# Patient Record
Sex: MALE | Race: WHITE
[De-identification: names, ages, dates, MRNs, and addresses within clinical notes are randomized per-mention and may not be internally consistent; named-entity substitution may affect disease eponyms.]

---

## 2017-04-16 ENCOUNTER — HOSPITAL ENCOUNTER (EMERGENCY)
Dept: HOSPITAL 60 - LB.ED | Age: 77
Discharge: HOME | End: 2017-04-16
Payer: MEDICARE

## 2017-04-16 VITALS — DIASTOLIC BLOOD PRESSURE: 91 MMHG | SYSTOLIC BLOOD PRESSURE: 144 MMHG

## 2017-04-16 DIAGNOSIS — Z95.1: ICD-10-CM

## 2017-04-16 DIAGNOSIS — I48.91: ICD-10-CM

## 2017-04-16 DIAGNOSIS — R10.30: Primary | ICD-10-CM

## 2017-04-16 DIAGNOSIS — F17.210: ICD-10-CM

## 2017-04-16 DIAGNOSIS — I10: ICD-10-CM

## 2017-04-16 DIAGNOSIS — Z79.899: ICD-10-CM

## 2017-04-16 DIAGNOSIS — E78.00: ICD-10-CM

## 2017-04-16 DIAGNOSIS — K21.9: ICD-10-CM

## 2017-04-16 DIAGNOSIS — Z79.82: ICD-10-CM

## 2017-04-17 NOTE — EDM.PDOC
ED HPI GENERAL MEDICAL PROBLEM





- General


Chief Complaint: General


Stated Complaint: WEAKNESS


Time Seen by Provider: 04/16/17 20:50


Source of Information: Reports: Patient


History Limitations: Reports: No limitations





- History of Present Illness


INITIAL COMMENTS - FREE TEXT/NARRATIVE: 





This is a 78yo M with severe abdominal pain prior to arrival in ER and resolved 

soon after arrival to ER. Patient denies prior episodes of abdominal pain. He 

denies any fever or chills and last meal was about 7pm. Patient denies other 

symptoms or concerns. No recent constipation or bM concerns. 


Onset: sudden


Duration: Minutes:


Location: Reports: abdomen


Quality: Reports: Ache


Severity: severe


Improves with: Reports: None


Worsens with: Reports: None


Associated Symptoms: Reports: denies other symptoms


Treatments PTA: Reports: Other (see below)


Other Treatments PTA: maxx shankar


  ** Lower Abdominal


Pain Score (Numeric/FACES): 10





- Related Data


 Allergies











Allergy/AdvReac Type Severity Reaction Status Date / Time


 


No Known Allergies Allergy   Verified 06/01/16 17:37











Home Meds: 


 Home Meds





Acetaminophen/Diphenhydramine [Tylenol Pm Ex-Strength Caplet] 1 tab PO DAILY 

PRN 05/22/16 [History]


Aspirin 81 mg PO DAILY 05/22/16 [History]


Diltiazem [Cardizem] 240 mg PO DAILY 05/22/16 [History]


Timolol [Betimol 0.5% Ophth Soln] 1 drop EYELF DAILY 05/22/16 [History]


atorvaSTATin Calcium [Atorvastatin Calcium] 40 mg PO Q48H 05/22/16 [History]











Past Medical History


HEENT History: Reports: Glaucoma, Impaired vision


Cardiovascular History: Reports: Afib, Bypass, High cholesterol, Hypertension


Respiratory History: Reports: None


Gastrointestinal History: Reports: None, GERD


Musculoskeletal History: Reports: Back pain, chronic


Neurological History: Reports: Vertigo


Other Neuro History: Occ. Headaches


Oncologic (Cancer) History: Reports: None





- Infectious Disease History


Infectious Disease History: Reports: Chicken pox, Measles





- Past Surgical History


Cardiovascular Surgical History: Reports: Coronary artery bypass


Neurological Surgical History: Reports: Lumbar spine, Thoracic spine


Other Neurological Surgeries/Procedures: B feet:  decrease in sensation, no 

pain in LEs


Musculoskeletal Surgical History: Reports: Other (see below)


Other Musculoskeletal Surgeries/Procedures:: Back Surgery





Social & Family History





- Tobacco Use


Smoking Status *Q: Current Every Day Smoker


Years of Tobacco use: 60


Packs/Tins Daily: 1


Used Tobacco, but Quit: No


Second Hand Smoke Exposure: No





- Alcohol Use


Days Per Week of Alcohol Use: 0





- Recreational Drug Use


Recreational Drug Use: No





ED ROS GENERAL





- Review of Systems


Review Of Systems: ROS reveals no pertinent complaints other than HPI.





ED EXAM, GENERAL





- Physical Exam


Exam: See Below


Exam Limited By: No limitations


General Appearance: alert, WD/WN, no apparent distress


Eye Exam: bilateral eye: PERRL


Ears: normal external exam


Nose: normal inspection


Throat/Mouth: Normal inspection


Head: atraumatic, normocephalic


Neck: normal inspection, supple, non-tender


Respiratory/Chest: no respiratory distress, lungs clear, normal breath sounds


Cardiovascular: normal peripheral pulses, regular rate, rhythm, no edema


GI/Abdominal: soft, non tender, no organomegaly, no distention, no abnormal 

bruit, no mass, abnormal bowel sounds: (hyperactive bowel sounds)


Back Exam: normal inspection


Extremities: normal inspection


Neurological: alert, oriented


Psychiatric: normal affect, normal mood





Course





- Vital Signs


Last Recorded V/S: 





 Last Vital Signs











Temp  36.4 C   04/16/17 20:54


 


Pulse  59 L  04/16/17 20:54


 


Resp  16   04/16/17 20:54


 


BP  144/91 H  04/16/17 20:54


 


Pulse Ox  99   04/16/17 20:54














Departure





- Departure


Time of Disposition: 21:00


Disposition: Home, Self-Care 01


Condition: good


Clinical Impression: 


Abdominal pain


Qualifiers:


 Abdominal location: lower abdomen, unspecified Qualified Code(s): R10.30 - 

Lower abdominal pain, unspecified





Forms:  ED Department Discharge





- Problem List & Annotations


(1) Abdominal pain


SNOMED Code(s): 59524259


   Code(s): R10.9 - UNSPECIFIED ABDOMINAL PAIN   Status: Acute   Priority: High

   


Qualifiers: 


   Abdominal location: lower abdomen, unspecified   Qualified Code(s): R10.30 - 

Lower abdominal pain, unspecified   





- Problem List Review


Problem List Initiated/Reviewed/Updated: Yes





- Assessment/Plan


Plan: 





Counseled on rtc or ER as needed if symptoms return. Discussed likely recent 

ingestion and change of meals and increased gas. Discussed supportive measures 

but for close f/u for imaging if symptoms return. Family agree for f/u as 

directed.

## 2017-12-30 ENCOUNTER — HOSPITAL ENCOUNTER (EMERGENCY)
Dept: HOSPITAL 60 - LB.ED | Age: 77
Discharge: HOME | End: 2017-12-30
Payer: MEDICARE

## 2017-12-30 VITALS — DIASTOLIC BLOOD PRESSURE: 76 MMHG | SYSTOLIC BLOOD PRESSURE: 156 MMHG

## 2017-12-30 DIAGNOSIS — Z79.82: ICD-10-CM

## 2017-12-30 DIAGNOSIS — I10: ICD-10-CM

## 2017-12-30 DIAGNOSIS — Z79.899: ICD-10-CM

## 2017-12-30 DIAGNOSIS — M10.9: Primary | ICD-10-CM

## 2017-12-30 DIAGNOSIS — F17.210: ICD-10-CM

## 2017-12-30 DIAGNOSIS — E78.00: ICD-10-CM

## 2017-12-30 NOTE — EDM.PDOC
ED HPI GENERAL MEDICAL PROBLEM





- General


Stated Complaint: SWOLLEN FOOT


Time Seen by Provider: 12/30/17 17:40


Source of Information: Reports: Patient, Family


History Limitations: Reports: No Limitations





- History of Present Illness


INITIAL COMMENTS - FREE TEXT/NARRATIVE: 





Patient is a 77 year old man with a history of gout who is having a right great 

toe attack of gout that has been worsening for one week. No injury, no fever or 

chills.  He is having a hard time walking on the foot due to the pain.


Onset: Gradual


Onset Date: 12/23/17


Onset Time: 08:00


Duration: Day(s): (7)


Location: Reports: Lower Extremity, Right


Quality: Reports: Ache, Same as Previous Episode, Sharp, Throbbing


Severity: Moderate


Improves with: Reports: Medication


Worsens with: Reports: None


Context: Reports: Other (History of gout.)


Associated Symptoms: Reports: No Other Symptoms





- Related Data


 Allergies











Allergy/AdvReac Type Severity Reaction Status Date / Time


 


No Known Allergies Allergy   Verified 12/30/17 17:57











Home Meds: 


 Home Meds





Acetaminophen/Diphenhydramine [Tylenol Pm Ex-Strength Caplet] 1 tab PO DAILY 

PRN 05/22/16 [History]


Aspirin 81 mg PO DAILY 05/22/16 [History]


Diltiazem IR [Cardizem] 180 mg PO DAILY 05/22/16 [History]


Timolol [Betimol 0.5% Ophth Soln] 1 drop EYELF DAILY 05/22/16 [History]


atorvaSTATin Calcium [Atorvastatin Calcium] 40 mg PO Q48H 05/22/16 [History]


Furosemide [Furosemide] 40 mg PO DAILY 12/30/17 [History]


Isosorbide Dinitrate 10 mg PO TID 12/30/17 [History]


Metoprolol Tartrate [Metoprolol Tartrate] 25 mg PO BID 12/30/17 [History]


Multivit, Ca, Min/FA/Soy Isofl [One-A-Day Menopause Formula Tb] 1 tab PO DAILY 

12/30/17 [History]


Ubidecarenone [Co Q-10] 200 mg PO DAILY 12/30/17 [History]











Past Medical History


HEENT History: Reports: Glaucoma, Impaired Vision


Cardiovascular History: Reports: Afib, Bypass, High Cholesterol, Hypertension


Respiratory History: Reports: None


Gastrointestinal History: Reports: None, GERD


Musculoskeletal History: Reports: Back Pain, Chronic


Neurological History: Reports: Vertigo


Other Neuro History: Occ. Headaches


Oncologic (Cancer) History: Reports: None





- Infectious Disease History


Infectious Disease History: Reports: Chicken Pox, Measles





- Past Surgical History


Cardiovascular Surgical History: Reports: Coronary Artery Bypass


Neurological Surgical History: Reports: Lumbar Spine, Thoracic Spine


Musculoskeletal Surgical History: Reports: Other (See Below)


Other Musculoskeletal Surgeries/Procedures:: laminectomy





Social & Family History





- Tobacco Use


Smoking Status *Q: Current Every Day Smoker


Years of Tobacco use: 60


Packs/Tins Daily: 1


Used Tobacco, but Quit: No


Second Hand Smoke Exposure: No





- Alcohol Use


Days Per Week of Alcohol Use: 0





- Recreational Drug Use


Recreational Drug Use: No





Review of Systems





- Review of Systems


Review Of Systems: See Below


Constitutional: Reports: No Symptoms


Eyes: Reports: No Symptoms


Ears: Reports: No Symptoms


Nose: Reports: No Symptoms


Mouth/Throat: Reports: No Symptoms


Respiratory: Reports: No Symptoms


Cardiovascular: Reports: No Symptoms


GI/Abdominal: Reports: No Symptoms


Genitourinary: Reports: No Symptoms


Musculoskeletal: Reports: Foot Pain (Right great toe MTP joint.)


Skin: Reports: Erythema (At right great toe joint.)


Neurological: Reports: No Symptoms


Psychiatric: Reports: No Symptoms





ED EXAM, GENERAL





- Physical Exam


Exam: See Below


Exam Limited By: No Limitations


General Appearance: Alert, WD/WN, No Apparent Distress


Eye Exam: Bilateral Eye: EOMI, Normal Fundi, Normal Inspection, PERRL


Ears: Normal External Exam, Normal Canal, Hearing Grossly Normal, Normal TMs


Ear Exam: Bilateral Ear: Auricle Normal, Canal Normal, TM normal


Nose: Normal Inspection


Throat/Mouth: Normal Inspection, Normal Lips, Normal Teeth, Normal Gums, Normal 

Oropharynx, Normal Voice, No Airway Compromise


Head: Atraumatic, Normocephalic


Neck: Normal Inspection, Supple, Non-Tender, Full Range of Motion


Respiratory/Chest: No Respiratory Distress, Lungs Clear, Normal Breath Sounds, 

No Accessory Muscle Use, Chest Non-Tender


Cardiovascular: Normal Peripheral Pulses, Regular Rate, Rhythm, No Edema, No 

Gallop, No JVD, No Murmur, No Rub


GI/Abdominal: Normal Bowel Sounds, Soft, Non-Tender, No Organomegaly, No 

Distention, No Abnormal Bruit, No Mass


Extremities: Redness (Right great toe MTP joint with pain on palpation and ROM.)


Neurological: Alert, Oriented, CN II-XII Intact, Normal Cognition, Normal Gait, 

Normal Reflexes, No Motor/Sensory Deficits


Psychiatric: Normal Affect, Normal Mood


Skin Exam: Warm, Dry, Intact, Normal Color, No Rash


Lymphatic: No Adenopathy





Course





- Vital Signs


Text/Narrative:: 





Uneventful ED course.  He had labs that showed gout was present.  He will be 

sent home on a Prednisone taper and Percocet 5-325 mg, one po q 4 hours prn, #

10 and he will follow up with Dr. Bender on next Tuesday or Wednesday.  Recheck 

here in ED if needed before then.





- Orders/Labs/Meds


Labs: 





 Laboratory Tests











  12/30/17 12/30/17 Range/Units





  17:55 17:55 


 


WBC  8.0   (4.0-11.0)  K/uL


 


RBC  3.56 L   (4.50-6.50)  M/uL


 


Hgb  11.2 L   (13.0-18.0)  g/dL


 


Hct  33.6 L   (40.0-54.0)  %


 


MCV  94   (76-96)  fL


 


MCH  31.5   (27.0-32.0)  pg


 


MCHC  33.3   (31.0-35.0)  g/dL


 


RDW  14.1   (11.0-16.0)  %


 


Plt Count  231   (150-400)  K/uL


 


MPV  11.2 H   (6.0-10.0)  fL


 


Neut % (Auto)  63.5   (45.0-70.0)  %


 


Lymph % (Auto)  22.4   (20.0-40.0)  %


 


Mono % (Auto)  10.4 H   (3.0-10.0)  %


 


Eos % (Auto)  3.3   (1.0-5.0)  %


 


Baso % (Auto)  0.4   (0.0-0.5)  %


 


Neut # (Auto)  5.08   (2.00-7.50)  K/uL


 


Lymph # (Auto)  1.79   (1.50-4.00)  K/uL


 


Mono # (Auto)  0.83 H   (0.20-0.80)  K/uL


 


Eos # (Auto)  0.26   (0.04-0.40)  K/uL


 


Baso # (Auto)  0.03   (0.02-0.10)  K/uL


 


Sodium   145  (136-145)  mmol/L


 


Potassium   3.3 L  (3.5-5.1)  mmol/L


 


Chloride   107  ()  mmol/L


 


Carbon Dioxide   31.8  (21.0-32.0)  mmol/L


 


Anion Gap   9.5  (5.0-15.0)  mmol/L


 


BUN   22  (8-26)  mg/dL


 


Creatinine   1.33 H D  (0.70-1.30)  mg/dL


 


Est Cr Clr Drug Dosing   TNP  


 


Estimated GFR (MDRD)   52 L  (>60)  MLS/MIN


 


BUN/Creatinine Ratio   16.5  (6-25)  


 


Glucose   130 H D  ()  mg/dL


 


Uric Acid   8.0 H  (2.6-7.2)  mg/dL


 


Calcium   8.4 L  (8.5-10.1)  mg/dL


 


Total Bilirubin   0.5  (0.0-1.0)  mg/dL


 


AST   20  (15-37)  U/L


 


ALT   19  (12-78)  U/L


 


Alkaline Phosphatase   38 L  ()  U/L


 


Total Protein   6.7  (6.4-8.2)  g/dL


 


Albumin   2.9 L  (3.4-5.0)  g/dL


 


Globulin   3.8  (2.2-4.2)  g/dL


 


Albumin/Globulin Ratio   0.8  (0.8-2.0)  














Departure





- Departure


Time of Disposition: 18:44


Disposition: Home, Self-Care 01


Condition: Good


Clinical Impression: 


 Gout attack








- Discharge Information


Referrals: 


PCP,None [Primary Care Provider] -

## 2018-05-28 ENCOUNTER — HOSPITAL ENCOUNTER (EMERGENCY)
Dept: HOSPITAL 60 - LB.ED | Age: 78
Discharge: HOME | End: 2018-05-28
Payer: MEDICARE

## 2018-05-28 DIAGNOSIS — M10.9: Primary | ICD-10-CM

## 2018-05-28 PROCEDURE — 99283 EMERGENCY DEPT VISIT LOW MDM: CPT

## 2018-05-29 VITALS — SYSTOLIC BLOOD PRESSURE: 145 MMHG | DIASTOLIC BLOOD PRESSURE: 95 MMHG

## 2018-05-29 NOTE — ER
The patient is an 78-year-old male, who comes in today with a chief complaint of pain and

swelling in the right foot.  The patient has a history of gout in the past.  He feels like

he is having another episode of gout.

 

ALLERGIES:  NKDA.

 

CURRENT MEDICATIONS:  Atorvastatin 40 mg p.o. daily, Ubidecarenone, CoQ10 of 200 mg p.o.

daily, timolol eye drops, metoprolol tartrate 25 mg p.o. b.i.d., isosorbide 10 mg p.o.

t.i.d., furosemide 40 mg p.o. daily, Cardizem 180 mg p.o. daily, aspirin 81 mg p.o. daily,

and Tylenol P.M.

 

PAST MEDICAL HISTORY:  The patient has a past history of atrial fibrillation.  He has had

gout in the past.  He has had CHF and has a history of coronary artery disease.  His wife

notes he had another stent placed.  He has had two in the last year, but otherwise had

cardiac bypass surgery about 10 years ago.

 

PHYSICAL EXAMINATION:

GENERAL:  He is alert, oriented, no apparent distress.  His right foot is swollen by the

metatarsal joint on the great toe.  It is warm, hot, red to the touch.  Appears quite

classic for gout.

 

ASSESSMENT:  Gout.

 

PLAN:  We will put him on some prednisone 40 mg p.o. daily x7 days, then decreasing by 10 mg

a day for 3 more days.  Patient to return to clinic if this fails to improve dramatically

over the next 24 to 48 hours.

 

 

NGOC

DD:  05/28/2018 20:55:40

DT:  05/29/2018 01:02:52

Job #:  [id number]

## 2018-08-28 ENCOUNTER — HOSPITAL ENCOUNTER (EMERGENCY)
Dept: HOSPITAL 60 - LB.ED | Age: 78
Discharge: HOME | End: 2018-08-28
Payer: MEDICARE

## 2018-08-28 VITALS — SYSTOLIC BLOOD PRESSURE: 143 MMHG | DIASTOLIC BLOOD PRESSURE: 96 MMHG

## 2018-08-28 DIAGNOSIS — I48.91: Primary | ICD-10-CM

## 2018-08-28 DIAGNOSIS — I10: ICD-10-CM

## 2018-08-28 NOTE — CR
DATE OF SERVICE:  08/28/18

CLINICAL DATA:  shortness of breath, chest pain



PORTABLE AP CHEST:  



Comparison made to a prior exam dated 08/25/18.



The heart and lungs are stable.  No acute abnormalities.  



[id number]
[facility]

## 2018-08-28 NOTE — EDM.PDOC
ED HPI GENERAL MEDICAL PROBLEM





- General


Chief Complaint: General


Stated Complaint: SOB


Time Seen by Provider: 08/28/18 02:45


Source of Information: Reports: Patient


History Limitations: Reports: No Limitations





- History of Present Illness


INITIAL COMMENTS - FREE TEXT/NARRATIVE: 





This is a 77yo M who comes in with shortness of breath and occasional chest 

pain. He states the shortness of breath started a month ago and he recently was 

in the ER for chest pains. He states he slept well yesterday but last night he 

was unable to sleep due to his shortness of breath. It is noted that he was 

recently changed from diltiazem and isosorbide to imdur and metorpolol. He was 

discharged on Saturday with metoprolol tartrate BID and Imdur 30mg under the 

direction of Dr. Chapman and Dr. Alston. He was counseled to stop his metoprolol 

tartrate and start on his recently obtained prescription of metoprolol 

succinate in the morning. He did not have any meds last evening. He states he 

feels a little better since getting up and outside prior to arriving at the ER. 


Onset: Gradual


Duration: Week(s): (4), Getting Worse


Location: Reports: Generalized


Severity: Mild


Treatments PTA: Reports: Aspirin





- Related Data


 Allergies











Allergy/AdvReac Type Severity Reaction Status Date / Time


 


No Known Allergies Allergy   Verified 05/29/18 00:27











Home Meds: 


 Home Meds





Aspirin 81 mg PO DAILY 05/22/16 [History]


Diltiazem IR [Cardizem] 180 mg PO DAILY 05/22/16 [History]


atorvaSTATin Calcium [Atorvastatin Calcium] 40 mg PO DAILY 05/22/16 [History]


Isosorbide Dinitrate 10 mg PO TID 12/30/17 [History]


Metoprolol Tartrate 50 mg PO DAILY 12/30/17 [History]


Clopidogrel Bisulfate [Clopidogrel] 1 tab PO DAILY 05/29/18 [History]











Past Medical History


HEENT History: Reports: Glaucoma, Impaired Vision


Cardiovascular History: Reports: Afib, Bypass, High Cholesterol, Hypertension


Respiratory History: Reports: None


Gastrointestinal History: Reports: GERD


Musculoskeletal History: Reports: Back Pain, Chronic


Neurological History: Reports: Vertigo


Other Neuro History: Occ. Headaches


Hematologic History: Reports: Anticoagulation Therapy


Oncologic (Cancer) History: Reports: None





- Infectious Disease History


Infectious Disease History: Reports: Chicken Pox





- Past Surgical History


HEENT Surgical History: Reports: None


Cardiovascular Surgical History: Reports: Coronary Artery Bypass


Other Cardiovascular Surgeries/Procedures: 1993


Neurological Surgical History: Reports: Lumbar Spine, Thoracic Spine


Musculoskeletal Surgical History: Reports: Other (See Below)


Other Musculoskeletal Surgeries/Procedures:: laminectomy





Social & Family History





- Family History


Family Medical History: Noncontributory





- Caffeine Use


Caffeine Use: Reports: Coffee





ED ROS GENERAL





- Review of Systems


Review Of Systems: ROS reveals no pertinent complaints other than HPI.





ED EXAM, GENERAL





- Physical Exam


Exam: See Below


Exam Limited By: No Limitations


General Appearance: Alert, WD/WN, No Apparent Distress


Eye Exam: Bilateral Eye: EOMI, PERRL


Ears: Normal External Exam


Nose: Normal Inspection


Throat/Mouth: Normal Inspection


Head: Atraumatic, Normocephalic


Neck: Normal Inspection


Respiratory/Chest: No Respiratory Distress, Lungs Clear, Normal Breath Sounds


Cardiovascular: Irregularly Irregular


Peripheral Pulses: 2+: Dorsalis Pedis (L), Dorsalis Pedis (R)


GI/Abdominal: Normal Bowel Sounds


Back Exam: Normal Inspection


Extremities: Normal Inspection





Course





- Vital Signs


Last Recorded V/S: 


 Last Vital Signs











Temp      


 


Pulse  110 H  08/28/18 04:25


 


Resp      


 


BP  143/96 H  08/28/18 04:25


 


Pulse Ox      














- Orders/Labs/Meds


Orders: 


 Active Orders 24 hr











 Category Date Time Status


 


 Cardiac Monitoring [RC] .As Directed Care  08/28/18 02:35 Active


 


 EKG Documentation Completion [RC] ASDIRECTED Care  08/28/18 02:58 Active


 


 HEPATITIS PANEL (4) Routine Lab  08/28/18 03:20 Received


 


 REVERSE T3, SERUM Routine Lab  08/28/18 03:20 Received


 


 THYROXINE (T4) FREE, DIRECT, S Routine Lab  08/28/18 03:20 Received











Labs: 


 Laboratory Tests











  08/28/18 08/28/18 08/28/18 Range/Units





  03:20 03:20 03:20 


 


WBC  8.6    (4.0-11.0)  K/uL


 


RBC  3.98 L    (4.50-6.50)  M/uL


 


Hgb  12.6 L    (13.0-18.0)  g/dL


 


Hct  37.5 L    (40.0-54.0)  %


 


MCV  94    (76-96)  fL


 


MCH  31.7    (27.0-32.0)  pg


 


MCHC  33.6    (31.0-35.0)  g/dL


 


RDW  15.4    (11.0-16.0)  %


 


Plt Count  166    (150-400)  K/uL


 


MPV  12.5 H    (6.0-10.0)  fL


 


Neut % (Auto)  63.3    (45.0-70.0)  %


 


Lymph % (Auto)  23.6    (20.0-40.0)  %


 


Mono % (Auto)  11.0 H    (3.0-10.0)  %


 


Eos % (Auto)  1.5    (1.0-5.0)  %


 


Baso % (Auto)  0.6 H    (0.0-0.5)  %


 


Neut # (Auto)  5.41    (2.00-7.50)  K/uL


 


Lymph # (Auto)  2.02    (1.50-4.00)  K/uL


 


Mono # (Auto)  0.94 H    (0.20-0.80)  K/uL


 


Eos # (Auto)  0.13    (0.04-0.40)  K/uL


 


Baso # (Auto)  0.05    (0.02-0.10)  K/uL


 


PT   11.9 H   (9.0-11.5)  sec


 


INR   1.2   (1.0-3.5)  


 


D-Dimer, Quantitative     (0-400)  ng/mL


 


Sodium    144  (136-145)  mmol/L


 


Potassium    4.0  (3.5-5.1)  mmol/L


 


Chloride    108 H  ()  mmol/L


 


Carbon Dioxide    23.8  (21.0-32.0)  mmol/L


 


Anion Gap    16.2 H  (5.0-15.0)  mmol/L


 


BUN    27 H  (8-26)  mg/dL


 


Creatinine    1.15  (0.70-1.30)  mg/dL


 


Est Cr Clr Drug Dosing    TNP  


 


Estimated GFR (MDRD)    > 60  (>60)  MLS/MIN


 


BUN/Creatinine Ratio    23.5  (6-25)  


 


Glucose    124 H  ()  mg/dL


 


Calcium    8.8  (8.5-10.1)  mg/dL


 


Total Bilirubin    1.1 H D  (0.0-1.0)  mg/dL


 


AST    48 H  (15-37)  U/L


 


ALT    91 H  (12-78)  U/L


 


Alkaline Phosphatase    40 L  ()  U/L


 


Troponin I    0.037  D  (0.000-0.060)  ng/mL


 


B-Natriuretic Peptide    35977 H D  (0-450)  pg/mL


 


Total Protein    6.7  (6.4-8.2)  g/dL


 


Albumin    3.5  (3.4-5.0)  g/dL


 


Globulin    3.2  (2.2-4.2)  g/dL


 


Albumin/Globulin Ratio    1.1  (0.8-2.0)  


 


TSH, Ultra Sensitive    10.312 H D  (0.358-3.740)  uIU/mL














  08/28/18 Range/Units





  03:20 


 


WBC   (4.0-11.0)  K/uL


 


RBC   (4.50-6.50)  M/uL


 


Hgb   (13.0-18.0)  g/dL


 


Hct   (40.0-54.0)  %


 


MCV   (76-96)  fL


 


MCH   (27.0-32.0)  pg


 


MCHC   (31.0-35.0)  g/dL


 


RDW   (11.0-16.0)  %


 


Plt Count   (150-400)  K/uL


 


MPV   (6.0-10.0)  fL


 


Neut % (Auto)   (45.0-70.0)  %


 


Lymph % (Auto)   (20.0-40.0)  %


 


Mono % (Auto)   (3.0-10.0)  %


 


Eos % (Auto)   (1.0-5.0)  %


 


Baso % (Auto)   (0.0-0.5)  %


 


Neut # (Auto)   (2.00-7.50)  K/uL


 


Lymph # (Auto)   (1.50-4.00)  K/uL


 


Mono # (Auto)   (0.20-0.80)  K/uL


 


Eos # (Auto)   (0.04-0.40)  K/uL


 


Baso # (Auto)   (0.02-0.10)  K/uL


 


PT   (9.0-11.5)  sec


 


INR   (1.0-3.5)  


 


D-Dimer, Quantitative  316  (0-400)  ng/mL


 


Sodium   (136-145)  mmol/L


 


Potassium   (3.5-5.1)  mmol/L


 


Chloride   ()  mmol/L


 


Carbon Dioxide   (21.0-32.0)  mmol/L


 


Anion Gap   (5.0-15.0)  mmol/L


 


BUN   (8-26)  mg/dL


 


Creatinine   (0.70-1.30)  mg/dL


 


Est Cr Clr Drug Dosing   


 


Estimated GFR (MDRD)   (>60)  MLS/MIN


 


BUN/Creatinine Ratio   (6-25)  


 


Glucose   ()  mg/dL


 


Calcium   (8.5-10.1)  mg/dL


 


Total Bilirubin   (0.0-1.0)  mg/dL


 


AST   (15-37)  U/L


 


ALT   (12-78)  U/L


 


Alkaline Phosphatase   ()  U/L


 


Troponin I   (0.000-0.060)  ng/mL


 


B-Natriuretic Peptide   (0-450)  pg/mL


 


Total Protein   (6.4-8.2)  g/dL


 


Albumin   (3.4-5.0)  g/dL


 


Globulin   (2.2-4.2)  g/dL


 


Albumin/Globulin Ratio   (0.8-2.0)  


 


TSH, Ultra Sensitive   (0.358-3.740)  uIU/mL











Meds: 


Medications














Discontinued Medications














Generic Name Dose Route Start Last Admin





  Trade Name Freq  PRN Reason Stop Dose Admin


 


Metoprolol Tartrate  5 mg  08/28/18 04:18  08/28/18 04:25





  Lopressor  IVPUSH  08/28/18 04:19  5 mg





  ONETIME ONE   Administration





     





     





     





     














Departure





- Departure


Time of Disposition: 05:00


Disposition: Home, Self-Care 01


Condition: Fair


Clinical Impression: 


 Shortness of breath, Chest tightness or pressure, Atrial fibrillation with RVR








- Discharge Information


Referrals: 


PCP,None [Primary Care Provider] - 


Forms:  ED Department Discharge





- Problem List Review


Problem List Initiated/Reviewed/Updated: Yes





- My Orders


Last 24 Hours: 


My Active Orders





08/28/18 02:35


Cardiac Monitoring [RC] .As Directed 





08/28/18 02:58


EKG Documentation Completion [RC] ASDIRECTED 





08/28/18 03:20


HEPATITIS PANEL (4) Routine 


REVERSE T3, SERUM Routine 


THYROXINE (T4) FREE, DIRECT, S Routine 














- Assessment/Plan


Last 24 Hours: 


My Active Orders





08/28/18 02:35


Cardiac Monitoring [RC] .As Directed 





08/28/18 02:58


EKG Documentation Completion [RC] ASDIRECTED 





08/28/18 03:20


HEPATITIS PANEL (4) Routine 


REVERSE T3, SERUM Routine 


THYROXINE (T4) FREE, DIRECT, S Routine 











Plan: 





Counseled on hospital admit but patient would like to go home. Discussed 

options and plan of care and patient will f/u closely in clinic and return to 

ER as needed. Discussed rate control and increase to metoprolol 75 mg ER. F/u 

in clinic for recheck and rate control and BP control closely and as needed in 

ER.

## 2018-08-29 ENCOUNTER — HOSPITAL ENCOUNTER (EMERGENCY)
Dept: HOSPITAL 60 - LB.ED | Age: 78
Discharge: NURSING, REHAB OR LONG-TERM CARE | End: 2018-08-29
Payer: MEDICARE

## 2018-08-29 DIAGNOSIS — Z53.21: Primary | ICD-10-CM

## 2018-08-29 PROCEDURE — A0425 GROUND MILEAGE: HCPCS

## 2018-08-29 PROCEDURE — A0429 BLS-EMERGENCY: HCPCS

## 2018-08-30 LAB — HEP C VIRUS AB: <0.1 S/CO RATIO (ref 0–0.9)

## 2018-09-10 ENCOUNTER — RECORDS - HEALTHEAST (OUTPATIENT)
Dept: ADMINISTRATIVE | Facility: OTHER | Age: 78
End: 2018-09-10

## 2018-09-13 ENCOUNTER — RECORDS - HEALTHEAST (OUTPATIENT)
Dept: ADMINISTRATIVE | Facility: OTHER | Age: 78
End: 2018-09-13

## 2019-02-11 ENCOUNTER — HOSPITAL ENCOUNTER (EMERGENCY)
Dept: HOSPITAL 60 - LB.ED | Age: 79
Discharge: HOME | End: 2019-02-11
Payer: COMMERCIAL

## 2019-02-11 VITALS — SYSTOLIC BLOOD PRESSURE: 116 MMHG | DIASTOLIC BLOOD PRESSURE: 80 MMHG

## 2019-02-11 DIAGNOSIS — I48.91: ICD-10-CM

## 2019-02-11 DIAGNOSIS — I10: ICD-10-CM

## 2019-02-11 DIAGNOSIS — I11.0: Primary | ICD-10-CM

## 2019-02-11 DIAGNOSIS — I50.9: ICD-10-CM

## 2019-02-11 DIAGNOSIS — I25.2: ICD-10-CM

## 2019-02-11 DIAGNOSIS — Z79.899: ICD-10-CM

## 2019-02-11 DIAGNOSIS — E78.00: ICD-10-CM

## 2019-02-11 DIAGNOSIS — J44.9: ICD-10-CM

## 2019-02-11 DIAGNOSIS — K21.9: ICD-10-CM

## 2019-02-11 DIAGNOSIS — Z79.01: ICD-10-CM

## 2019-02-11 NOTE — EDM.PDOC
ED HPI GENERAL MEDICAL PROBLEM





- General


Chief Complaint: Cardiovascular Problem


Stated Complaint: SOB/weight gain


Time Seen by Provider: 02/11/19 14:20


Source of Information: Reports: Patient, Family


History Limitations: Reports: No Limitations





- History of Present Illness


INITIAL COMMENTS - FREE TEXT/NARRATIVE: 


Pt is 78 year old male with severe ischemic cardiomyopathy with CHF.According  

to patient's spouse , patient has gained about 11 lbs of weight over the past 1 

wk. He has also been having increased swelling of the lower extremities and 

noted to be short of breath with exertion. She did call his Cardiologist's 

office at [facility] and was told to go to emergency room.Pt has been on 

lasix 30mg BID. He did not get his lasix today as his systolic pressure was 

90mmhg in the morning.





Pt on questioning claims he feels fine. No chest pain or discomfort. His SPO2 

is 93% on room air. Presently not short of breath. No other complaints.





Onset: Gradual


Duration: Week(s): (1), Getting Worse


Severity: Mild


Improves with: Reports: None


Worsens with: Reports: None


Associated Symptoms: Reports: Shortness of Breath, Weakness.  Denies: Confusion

, Chest Pain, Cough, Diaphoresis, Fever/Chills, Headaches, Nausea/Vomiting, Rash

, Seizure, Syncope





- Related Data


 Allergies











Allergy/AdvReac Type Severity Reaction Status Date / Time


 


No Known Allergies Allergy   Verified 02/11/19 14:29











Home Meds: 


 Home Meds





Apixaban [Eliquis] 5 mg PO BID 09/09/18 [History]


Timolol [Betimol 0.5% Ophth Soln] 1 drop EYELF QPM 09/09/18 [History]


atorvaSTATin [Lipitor] 40 mg PO BEDTIME 11/02/18 [History]


Acetaminophen [Tylenol Arthritis Pain] 650 mg PO Q4H PRN  tab.er 11/12/18 [Rx]


Amiodarone [Cordarone] 200 mg PO DAILY #0 tablet 11/12/18 [Rx]


Clopidogrel [Plavix] 75 mg PO DAILY  tablet 11/12/18 [Rx]


Levothyroxine Sodium [Synthroid] 100 mcg PO ACBREAKFAST 30 Days #30 tab 11/12/ 18 [Rx]


Nitroglycerin [Nitrostat] 0.4 mg SL Q5M PRN  tab.sl 11/12/18 [Rx]


Tamsulosin [Flomax] 0.4 mg PO BEDTIME 30 Days #30 cap.er 11/12/18 [Rx]


Famotidine [Pepcid] 20 mg PO ACBREAKFAST 02/11/19 [History]


Furosemide [Lasix] 30 mg PO ASDIRECTED PRN 02/11/19 [History]


Metoprolol Tartrate 50 mg PO BID 02/11/19 [History]


Potassium Chloride [Klor-Con] 20 meq PO DAILY 02/11/19 [History]











Past Medical History


HEENT History: Reports: Glaucoma, Impaired Vision


Cardiovascular History: Reports: Afib, Bypass, High Cholesterol, Hypertension, 

MI, Pacemaker


Other Cardiovascular History: cardiac arrest 10/18 pacemaker, 2 recent stents, 

automatic defibrillater


Respiratory History: Reports: COPD


Gastrointestinal History: Reports: GERD


Musculoskeletal History: Reports: Back Pain, Chronic


Neurological History: Reports: Vertigo


Other Neuro History: Occ. Headaches


Endocrine/Metabolic History: Reports: Hypoparathyroidism


Hematologic History: Reports: Anticoagulation Therapy


Oncologic (Cancer) History: Reports: None





- Infectious Disease History


Infectious Disease History: Reports: Chicken Pox





- Past Surgical History


HEENT Surgical History: Reports: None


Cardiovascular Surgical History: Reports: Coronary Artery Bypass, Coronary 

Artery Stent, Pacer


Other Cardiovascular Surgeries/Procedures: 1993


GI Surgical History: Reports: None


Neurological Surgical History: Reports: Lumbar Spine, Thoracic Spine


Musculoskeletal Surgical History: Reports: Other (See Below)


Other Musculoskeletal Surgeries/Procedures:: laminectomy





Social & Family History





- Family History


Family Medical History: Noncontributory





- Caffeine Use


Caffeine Use: Reports: Coffee





ED ROS GENERAL





- Review of Systems


Review Of Systems: See Below


Constitutional: Reports: Weakness.  Denies: Fever, Chills, Night Sweats, 

Diaphoresis


HEENT: Denies: Rhinitis, Throat Pain, Throat Swelling


Respiratory: Reports: Shortness of Breath.  Denies: Wheezing, Pleuritic Chest 

Pain, Cough, Sputum


Cardiovascular: Reports: Edema.  Denies: Chest Pain, Lightheadedness, Syncope


GI/Abdominal: Denies: Abdominal Pain, Nausea, Vomiting


Musculoskeletal: Denies: Joint Pain, Joint Swelling


Skin: Denies: Bruising, Pruritis, Rash


Neurological: Denies: Confusion, Dizziness, Headache, Numbness, Tingling





ED EXAM, GENERAL





- Physical Exam


Exam: See Below


Exam Limited By: No Limitations


General Appearance: Alert, WD/WN, No Apparent Distress


Eye Exam: Bilateral Eye: EOMI, PERRL


Ears: Normal External Exam, Normal Canal, Hearing Grossly Normal, Normal TMs


Ear Exam: Bilateral Ear: Auricle Normal, Canal Normal, TM normal


Nose: Normal Inspection, Normal Mucosa, No Blood


Throat/Mouth: Normal Inspection, Normal Lips, Normal Teeth, Normal Gums, Normal 

Oropharynx, Normal Voice, No Airway Compromise


Head: Atraumatic, Normocephalic


Neck: Normal Inspection, Supple, Non-Tender, Full Range of Motion


Respiratory/Chest: No Respiratory Distress, Lungs Clear, Normal Breath Sounds, 

No Accessory Muscle Use, Chest Non-Tender


Cardiovascular: Normal Peripheral Pulses, Regular Rate, Rhythm, No Edema, No 

Gallop, No JVD, No Murmur, No Rub


GI/Abdominal: Normal Bowel Sounds, Soft, Non-Tender, No Organomegaly, No 

Distention, No Abnormal Bruit, No Mass


Extremities: Normal Inspection, Pedal Edema (3+ pitting type B/L)


Neurological: Alert, Oriented





EKG INTERPRETATION


EKG Date: 02/11/19


EKG Interpretation Comments: 


paced rhythm rate 70s








Course





- Vital Signs


Text/Narrative:: 


Pt's clinical exam appears normal other than his lower extremity pitting edema. 

His cbc is normal. CMP shows normal renal functions and electrolytes. His BNP 

is elevated from chronic CHF. He is not n any distress. His Chest x-ray appears 

normal, other than cardiomegaly, no signs of fluid over load.





Pt and family reassured with results. I have recommended daily weights along 

with close  fluid intake and output monitoring. Strict salt restriction to 1.2 

gm daily. Fluid restriction to 1000cc/ daily.He does have lower extremity edema 

where all the fluid seems to retained. He did receive 20mg IV Lasix. Also I 

have stopped his lasix and tried oral bumex 40mg BID for next week to see, if 

he diuresis better. Also advised to continue potassium daily. 





Call in 2-3 days with weight and fluid charting. Return to emergency room if 

symptoms worsen.





Last Recorded V/S: 





 Last Vital Signs











Temp  97.7 F   02/11/19 14:17


 


Pulse  68   02/11/19 15:18


 


Resp  20   02/11/19 15:18


 


BP  116/80   02/11/19 14:30


 


Pulse Ox  99   02/11/19 15:18














- Orders/Labs/Meds


Orders: 





 Active Orders 24 hr











 Category Date Time Status


 


 Chest 1V Frontal [CR] Stat Exams  02/11/19 14:40 Taken











Labs: 





 Laboratory Tests











  02/11/19 02/11/19 02/11/19 Range/Units





  14:40 14:40 14:40 


 


WBC   5.4  D   (4.0-11.0)  K/uL


 


RBC   3.01 L   (4.50-6.50)  M/uL


 


Hgb   8.8 L   (13.0-18.0)  g/dL


 


Hct   28.1 L   (40.0-54.0)  %


 


MCV   93   (76-96)  fL


 


MCH   29.2   (27.0-32.0)  pg


 


MCHC   31.3   (31.0-35.0)  g/dL


 


RDW   15.2   (11.0-16.0)  %


 


Plt Count   205   (150-400)  K/uL


 


MPV   12.0 H   (6.0-10.0)  fL


 


Neut % (Auto)   65.4   (45.0-70.0)  %


 


Lymph % (Auto)   20.6   (20.0-40.0)  %


 


Mono % (Auto)   9.5   (3.0-10.0)  %


 


Eos % (Auto)   3.9   (1.0-5.0)  %


 


Baso % (Auto)   0.6 H   (0.0-0.5)  %


 


Neut # (Auto)   3.52   (2.00-7.50)  K/uL


 


Lymph # (Auto)   1.11 L   (1.50-4.00)  K/uL


 


Mono # (Auto)   0.51   (0.20-0.80)  K/uL


 


Eos # (Auto)   0.21   (0.04-0.40)  K/uL


 


Baso # (Auto)   0.03   (0.02-0.10)  K/uL


 


Sodium    143  (136-145)  mmol/L


 


Potassium    3.7  (3.5-5.1)  mmol/L


 


Chloride    103  ()  mmol/L


 


Carbon Dioxide    29.0  D  (21.0-32.0)  mmol/L


 


Anion Gap    14.7  (5.0-15.0)  mmol/L


 


BUN    24  (8-26)  mg/dL


 


Creatinine    1.31 H  (0.70-1.30)  mg/dL


 


Est Cr Clr Drug Dosing    TNP  


 


Estimated GFR (MDRD)    53 L  (>60)  MLS/MIN


 


BUN/Creatinine Ratio    18.3  (6-25)  


 


Glucose    113 H  ()  mg/dL


 


Calcium    8.7  (8.5-10.1)  mg/dL


 


Total Bilirubin    0.7  (0.0-1.0)  mg/dL


 


AST    22  (15-37)  U/L


 


ALT    25  (12-78)  U/L


 


Alkaline Phosphatase    50  ()  U/L


 


B-Natriuretic Peptide  29640 H D    (0-450)  pg/mL


 


Total Protein    7.1  (6.4-8.2)  g/dL


 


Albumin    3.4  (3.4-5.0)  g/dL


 


Globulin    3.7  (2.2-4.2)  g/dL


 


Albumin/Globulin Ratio    0.9  (0.8-2.0)  











Meds: 





Medications














Discontinued Medications














Generic Name Dose Route Start Last Admin





  Trade Name Freq  PRN Reason Stop Dose Admin


 


Furosemide  Confirm  02/11/19 15:51  





  Lasix  Administered  02/11/19 15:52  





  Dose   





  40 mg   





  .ROUTE   





  .STK-MED ONE   





     





     





     





     


 


Furosemide  20 mg  02/11/19 15:45  





  Lasix  IVPUSH  02/11/19 15:46  





  NOW ONE   





     





     





     





     














Departure





- Departure


Time of Disposition: 15:30


Disposition: Home, Self-Care 01


Condition: Fair


Clinical Impression: 


 CHF (congestive heart failure), NYHA class III





Instructions:  Low-Sodium Eating Plan, Heart Failure, Easy-to-Read


Referrals: 


PCP,None [Primary Care Provider] - 


Forms:  ED Department Discharge


Additional Instructions: 


Take Bumex 1mg by mouth twice a day. May start tonight.


Monitor strict intake and output of fluids. 


Continue with daily weights.


Restrict sodium to 1,200mg in a day.


Report back to Dr. Chapman in 2-3 days to see how Bumex is working for you. Call 

clinic at [phone].





Seek medical attention for severe shortness of breath, nausea/vomiting, chest 

pain, or dizziness.





- Problem List & Annotations


(1) CHF (congestive heart failure), NYHA class III


SNOMED Code(s): 768107974, 222110238


   Code(s): I50.9 - HEART FAILURE, UNSPECIFIED   Status: Acute   Current Visit: 

Yes   





- Problem List Review


Problem List Initiated/Reviewed/Updated: Yes





- My Orders


Last 24 Hours: 





My Active Orders





02/11/19 14:40


Chest 1V Frontal [CR] Stat 














- Assessment/Plan


Last 24 Hours: 





My Active Orders





02/11/19 14:40


Chest 1V Frontal [CR] Stat 











Assessment:: 


CHF with weight gain





Plan: 


Pt's clinical exam appears normal other than his lower extremity pitting edema. 

His cbc is normal. CMP shows normal renal functions and electrolytes. His BNP 

is elevated from chronic CHF. He is not n any distress. His Chest x-ray appears 

normal, other than cardiomegaly, no signs of fluid over load.





Pt and family reassured with results. I have recommended daily weights along 

with close  fluid intake and output monitoring. Strict salt restriction to 1.2 

gm daily. Fluid restriction to 1000cc/ daily.He does have lower extremity edema 

where all the fluid seems to retained. He did receive 20mg IV Lasix. Also I 

have stopped his lasix and tried oral bumex 40mg BID for next week to see, if 

he diuresis better. Also advised to continue potassium daily. 





Call in 2-3 days with weight and fluid charting. Return to emergency room if 

symptoms worsen.

## 2019-02-11 NOTE — CR
PORTABLE CHEST, 02/11/19



Comparison is made to a prior exam dated 12/16/18. 



The patient is status post median sternotomy.  The cardiac pacer and pacer 
wires remain unchanged in position.  



The heart remains enlarged, unchanged.  The aorta is calcified and ectatic.  



The pulmonary vascular congestion on the prior exam has improved.  



The lungs are clear.  No pneumothorax.  No pleural effusions.  



[id number]
MTDD

## 2021-07-24 NOTE — EDM.PDOC
ED HPI GENERAL MEDICAL PROBLEM





- General


Chief Complaint: General


Stated Complaint: weakness, yellow skin


Time Seen by Provider: 07/24/21 13:05


Source of Information: Reports: Patient


History Limitations: Reports: No Limitations





- History of Present Illness


INITIAL COMMENTS - FREE TEXT/NARRATIVE: 





patient was brought to the ER by his wife - due to a c/o jaundice and weight 

loss for 1-2 weeks.





Loss appetite as well. Lost around 20 lbs over 1 month.





no N/V/D.





but repots his stool is pale and got dark urine. 





no abdominal discomfort.





h/o CAD, and CHF - has a pacemaker. 














Onset: Gradual


Duration: Week(s): (2)


  ** Right Lower Abdomen


Pain Score (Numeric/FACES): 1





- Related Data


                                    Allergies











Allergy/AdvReac Type Severity Reaction Status Date / Time


 


No Known Allergies Allergy   Verified 02/11/19 14:29











Home Meds: 


                                    Home Meds





Apixaban [Eliquis] 5 mg PO BID 09/09/18 [History]


Timolol [Betimol 0.5% Ophth Soln] 1 drop EYELF QPM 09/09/18 [History]


atorvaSTATin [Lipitor] 40 mg PO BEDTIME 11/02/18 [History]


Acetaminophen [Tylenol Arthritis Pain] 650 mg PO Q4H PRN  tab.er 11/12/18 [Rx]


Amiodarone [Cordarone] 200 mg PO DAILY #0 tablet 11/12/18 [Rx]


Clopidogrel [Plavix] 75 mg PO DAILY  tablet 11/12/18 [Rx]


Levothyroxine Sodium [Synthroid] 100 mcg PO ACBREAKFAST 30 Days #30 tab 11/12/18

 [Rx]


Nitroglycerin [Nitrostat] 0.4 mg SL Q5M PRN  tab.sl 11/12/18 [Rx]


Tamsulosin [Flomax] 0.4 mg PO BEDTIME 30 Days #30 cap.er 11/12/18 [Rx]


Famotidine [Pepcid] 20 mg PO ACBREAKFAST 02/11/19 [History]


Furosemide [Lasix] 30 mg PO ASDIRECTED PRN 02/11/19 [History]


Metoprolol Tartrate 50 mg PO BID 02/11/19 [History]


Potassium Chloride [Klor-Con] 20 meq PO DAILY 02/11/19 [History]











Past Medical History


HEENT History: Reports: Glaucoma, Impaired Vision


Cardiovascular History: Reports: Afib, Bypass, High Cholesterol, Hypertension, 

MI, Pacemaker


Other Cardiovascular History: cardiac arrest 10/18 pacemaker, 2 recent stents, 

automatic defibrillater


Respiratory History: Reports: COPD


Gastrointestinal History: Reports: GERD


Musculoskeletal History: Reports: Back Pain, Chronic


Neurological History: Reports: Vertigo


Other Neuro History: Occ. Headaches


Endocrine/Metabolic History: Reports: Hypoparathyroidism


Hematologic History: Reports: Anticoagulation Therapy


Oncologic (Cancer) History: Reports: None





- Infectious Disease History


Infectious Disease History: Reports: Chicken Pox





- Past Surgical History


HEENT Surgical History: Reports: None


Cardiovascular Surgical History: Reports: Coronary Artery Bypass, Coronary 

Artery Stent, Pacer


Other Cardiovascular Surgeries/Procedures: 1993


GI Surgical History: Reports: None


Neurological Surgical History: Reports: Lumbar Spine, Thoracic Spine


Musculoskeletal Surgical History: Reports: Other (See Below)


Other Musculoskeletal Surgeries/Procedures:: laminectomy





Social & Family History





- Family History


Family Medical History: No Pertinent Family History





- Caffeine Use


Caffeine Use: Reports: Coffee





ED ROS GENERAL





- Review of Systems


Review Of Systems: See Below


Constitutional: Reports: Malaise, Fatigue, Weight Loss


Respiratory: Reports: No Symptoms


Cardiovascular: Reports: No Symptoms


Skin: Reports: Jaundice





ED EXAM, GENERAL





- Physical Exam


Exam: See Below


Exam Limited By: No Limitations


General Appearance: Alert, WD/WN, No Apparent Distress, Thin, Cachetic


Eye Exam: Bilateral Eye: EOMI


Head: Atraumatic


Neck: Normal Inspection


Respiratory/Chest: No Respiratory Distress, Lungs Clear, Normal Breath Sounds


Cardiovascular: Normal Peripheral Pulses, Regular Rate, Rhythm


GI/Abdominal: Normal Bowel Sounds, Soft, Non-Tender, Distended


Neurological: Alert, Oriented, No Motor/Sensory Deficits


Psychiatric: Normal Affect


Skin Exam: Jaundice





Course





- Vital Signs


Last Recorded V/S: 


                                Last Vital Signs











Temp  37.1 C   07/24/21 13:14


 


Pulse  85   07/24/21 13:14


 


Resp  18   07/24/21 13:14


 


BP  106/81   07/24/21 13:14


 


Pulse Ox  98   07/24/21 13:14














- Orders/Labs/Meds


Orders: 


                               Active Orders 24 hr











 Category Date Time Status


 


 Patient Status [ADT] Routine ADT  07/24/21 14:51 Ordered


 


 Oxygen Therapy [RC] PRN Care  07/24/21 14:51 Ordered


 


 Vital Signs [RC] Q4H Care  07/24/21 14:51 Ordered


 


 Regular Diet [DIET] Diet  07/24/21 Dinner Ordered


 


 Abdomen Pelvis wo Cont [CT] Stat Exams  07/24/21 13:46 Taken


 


 UA W/MICROSCOPIC [URIN] Stat Lab  07/24/21 12:51 Ordered


 


 Resuscitation Status Routine Resus Stat  07/24/21 14:51 Ordered











Labs: 


                                Laboratory Tests











  07/24/21 07/24/21 07/24/21 Range/Units





  13:00 13:00 13:00 


 


WBC  6.7    (4.0-11.0)  K/uL


 


RBC  3.98 L    (4.50-6.50)  M/uL


 


Hgb  12.7 L    (13.0-18.0)  g/dL


 


Hct  35.9 L    (40.0-54.0)  %


 


MCV  90    (76-96)  fL


 


MCH  31.9    (27.0-32.0)  pg


 


MCHC  35.4 H    (31.0-35.0)  g/dL


 


RDW  16.4 H    (11.0-16.0)  %


 


Plt Count  209  D    (150-400)  K/uL


 


MPV  12.5 H    (6.0-10.0)  fL


 


Sodium    141  (136-145)  mmol/L


 


Potassium    2.1 L* D  (3.5-5.1)  mmol/L


 


Chloride    99  ()  mmol/L


 


Carbon Dioxide    32.3 H  (21.0-32.0)  mmol/L


 


Anion Gap    11.8  (5.0-15.0)  mmol/L


 


BUN    19  (8-26)  mg/dL


 


Creatinine    1.57 H D  (0.70-1.30)  mg/dL


 


Est Cr Clr Drug Dosing    31.01  mL/min


 


Estimated GFR (MDRD)    42 L  (>60)  MLS/MIN


 


BUN/Creatinine Ratio    12.1  (6-25)  


 


Glucose    151 H D  ()  mg/dL


 


Calcium    8.8  (8.5-10.1)  mg/dL


 


Magnesium     (1.8-2.4)  mg/dL


 


Total Bilirubin    5.3 H D  (0.0-1.0)  mg/dL


 


Direct Bilirubin     (0.0-0.3)  mg/dL


 


AST    140 H  (15-37)  U/L


 


ALT    120 H  (12-78)  U/L


 


Alkaline Phosphatase    391 H  ()  U/L


 


B-Natriuretic Peptide     (0-450)  pg/mL


 


Total Protein    7.5  (6.4-8.2)  g/dL


 


Albumin    2.5 L  (3.4-5.0)  g/dL


 


Globulin    5.0 H  (2.2-4.2)  g/dL


 


Albumin/Globulin Ratio    0.5 L  (0.8-2.0)  


 


Lipase   756 H*   ()  U/L














  07/24/21 07/24/21 Range/Units





  13:00 13:00 


 


WBC    (4.0-11.0)  K/uL


 


RBC    (4.50-6.50)  M/uL


 


Hgb    (13.0-18.0)  g/dL


 


Hct    (40.0-54.0)  %


 


MCV    (76-96)  fL


 


MCH    (27.0-32.0)  pg


 


MCHC    (31.0-35.0)  g/dL


 


RDW    (11.0-16.0)  %


 


Plt Count    (150-400)  K/uL


 


MPV    (6.0-10.0)  fL


 


Sodium    (136-145)  mmol/L


 


Potassium    (3.5-5.1)  mmol/L


 


Chloride    ()  mmol/L


 


Carbon Dioxide    (21.0-32.0)  mmol/L


 


Anion Gap    (5.0-15.0)  mmol/L


 


BUN    (8-26)  mg/dL


 


Creatinine    (0.70-1.30)  mg/dL


 


Est Cr Clr Drug Dosing    mL/min


 


Estimated GFR (MDRD)    (>60)  MLS/MIN


 


BUN/Creatinine Ratio    (6-25)  


 


Glucose    ()  mg/dL


 


Calcium    (8.5-10.1)  mg/dL


 


Magnesium  2.0   (1.8-2.4)  mg/dL


 


Total Bilirubin    (0.0-1.0)  mg/dL


 


Direct Bilirubin  4.1 H   (0.0-0.3)  mg/dL


 


AST    (15-37)  U/L


 


ALT    (12-78)  U/L


 


Alkaline Phosphatase    ()  U/L


 


B-Natriuretic Peptide   5462 H D  (0-450)  pg/mL


 


Total Protein    (6.4-8.2)  g/dL


 


Albumin    (3.4-5.0)  g/dL


 


Globulin    (2.2-4.2)  g/dL


 


Albumin/Globulin Ratio    (0.8-2.0)  


 


Lipase    ()  U/L











Meds: 


Medications














Discontinued Medications














Generic Name Dose Route Start Last Admin





  Trade Name Haley  PRN Reason Stop Dose Admin


 


Potassium Chloride 10 meq/  50 mls @ 50 mls/hr  07/24/21 13:46  07/24/21 14:02





  Premix  IV  07/24/21 14:45  50 mls/hr





  ONETIME ONE   Administration














- Radiology Interpretation


Free Text/Narrative:: 





CT abd/pelv - showed dilation of CBD, and central pancreatic mass 2.1X2.6cm - 

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











- Re-Assessments/Exams


Free Text/Narrative Re-Assessment/Exam: 





labs significant for hypokalemia, and mild elevation in Cr.





also significant for elevation in TB and DB, Alk Phos and AST/ALT. lipase as 

well.





concerns for pancreatic malignancy 





CT abd/pelv - showed a  pancreatic mass.





IVF was started and K IV replacement








called [facility] - spoke to the hospitalist - on Oncology oncall this 

weekend and no GI, but will be available next week - recommended to call cancer 

center to arrange for a plan on Monday [phone]





Will admit the patient for hydration and K replacement 





patient and wife agreed with the plan 








Departure





- Departure


Time of Disposition: 14:58


Disposition: Refer to Observation


Condition: Fair


Clinical Impression: 


 Acute hypokalemia, Jaundice, Dehydration





Pancreatic cancer


Qualifiers:


 Pancreatic malignancy location: body of pancreas Qualified Code(s): C25.1 - 

Malignant neoplasm of body of pancreas








- Discharge Information


*PRESCRIPTION DRUG MONITORING PROGRAM REVIEWED*: Not Applicable


*COPY OF PRESCRIPTION DRUG MONITORING REPORT IN PATIENT LILLIAM: Not Applicable


Forms:  ED Department Discharge





Sepsis Event Note (ED)





- Evaluation


Sepsis Screening Result: No Definite Risk





- Focused Exam


Vital Signs: 


                                   Vital Signs











  Temp Pulse Resp BP Pulse Ox


 


 07/24/21 13:14  37.1 C  85  18  106/81  98














- Problem List & Annotations


(1) Acute hypokalemia


SNOMED Code(s): 05835838


   Code(s): E87.6 - HYPOKALEMIA   Status: Acute   Priority: Medium   





(2) Dehydration


SNOMED Code(s): 58409563


   Code(s): E86.0 - DEHYDRATION   Status: Acute   Priority: Medium   





(3) Jaundice


SNOMED Code(s): 41628679


   Code(s): R17 - UNSPECIFIED JAUNDICE   Status: Acute   Priority: Medium   





(4) Pancreatic cancer


SNOMED Code(s): 261908481


   Code(s): C25.9 - MALIGNANT NEOPLASM OF PANCREAS, UNSPECIFIED   Status: Acute 

  Priority: Medium   


Qualifiers: 


   Pancreatic malignancy location: body of pancreas   Qualified Code(s): C25.1 -

 Malignant neoplasm of body of pancreas   





- Problem List Review


Problem List Initiated/Reviewed/Updated: Yes





- My Orders


Last 24 Hours: 


My Active Orders





07/24/21 12:51


UA W/MICROSCOPIC [URIN] Stat 





07/24/21 13:46


Abdomen Pelvis wo Cont [CT] Stat 





07/24/21 14:51


Patient Status [ADT] Routine 


Oxygen Therapy [RC] PRN 


Vital Signs [RC] Q4H 


Resuscitation Status Routine 





07/24/21 Dinner


Regular Diet [DIET] 














- Assessment/Plan


Last 24 Hours: 


My Active Orders





07/24/21 12:51


UA W/MICROSCOPIC [URIN] Stat 





07/24/21 13:46


Abdomen Pelvis wo Cont [CT] Stat 





07/24/21 14:51


Patient Status [ADT] Routine 


Oxygen Therapy [RC] PRN 


Vital Signs [RC] Q4H 


Resuscitation Status Routine 





07/24/21 Dinner


Regular Diet [DIET] 











Plan: 





- admit to observation 





1- dehydration: gentle IVF  and hydration. h/o CFH





2- Acute hypokalemia: K replacement 





3- jaundice: 2/2 pancreatic CA. Will contact cancer center on Monday for 

arrangement 





4- weight loss and cachexia: 2/2 above





5- h/o CHF: resume home meds as before

## 2021-07-25 NOTE — PCM.HP.2
H&P History of Present Illness





- General


Date of Service: 07/25/21


Admit Problem/Dx: 


                           Admission Diagnosis/Problem





Admission Diagnosis/Problem      Acute hypokalemia








Source of Information: Patient


History Limitations: Reports: No Limitations





- History of Present Illness


Initial Comments - Free Text/Narative: 





patient was admitted to the floor for management of severe dehydration and 

hypokalemia





initially presented to the ER with wife due to lack of PO intake and jaundice 

for 1 week - also weight loss - around 20 lbs over a month or so.





In the ER, labs and images were ordered.





Significant for elevation in LFTs and bilirubin. Also very low K level.  CT 

images showed a pancreatic mass - possible adenocarcinoma. 





Was admitted to the floor for hydration and K correction.


  ** Right Lower Abdomen


Pain Score (Numeric/FACES): 1





- Related Data


Allergies/Adverse Reactions: 


                                    Allergies











Allergy/AdvReac Type Severity Reaction Status Date / Time


 


No Known Allergies Allergy   Verified 07/24/21 16:21











Home Medications: 


                                    Home Meds





Apixaban [Eliquis] 5 mg PO BID 09/09/18 [History]


atorvaSTATin [Lipitor] 40 mg PO BEDTIME 11/02/18 [History]


Acetaminophen [Tylenol Arthritis Pain] 650 mg PO Q4H PRN  tab.er 11/12/18 [Rx]


Clopidogrel [Plavix] 75 mg PO DAILY  tablet 11/12/18 [Rx]


Levothyroxine Sodium [Synthroid] 100 mcg PO ACBREAKFAST 30 Days #30 tab 11/12/18

 [Rx]


Nitroglycerin [Nitrostat] 0.4 mg SL Q5M PRN  tab.sl 11/12/18 [Rx]


Tamsulosin [Flomax] 0.4 mg PO BEDTIME 30 Days #30 cap.er 11/12/18 [Rx]


Famotidine [Pepcid] 20 mg PO BID 02/11/19 [History]


Potassium Chloride [Klor-Con] 20 meq PO ASDIRECTED 02/11/19 [History]


Aspirin 81 mg PO DAILY 07/24/21 [History]


Bumetanide [Bumex] 1 mg PO ASDIRECTED 07/24/21 [History]


Metoprolol Tartrate 25 mg PO BID 07/24/21 [History]


timoloL maleate [Timoptic 0.25% Ophth Soln] 1 drop EYELF BEDTIME 07/24/21 

[History]











Past Medical History


HEENT History: Reports: Glaucoma, Impaired Vision


Cardiovascular History: Reports: Afib, Bypass, High Cholesterol, Hypertension, 

MI, Pacemaker


Other Cardiovascular History: cardiac arrest 10/18 pacemaker, 2 recent stents, 

automatic defibrillater


Respiratory History: Reports: COPD


Gastrointestinal History: Reports: GERD


Musculoskeletal History: Reports: Back Pain, Chronic


Neurological History: Reports: Vertigo


Other Neuro History: Occ. Headaches


Endocrine/Metabolic History: Reports: Hypoparathyroidism


Hematologic History: Reports: Anticoagulation Therapy


Oncologic (Cancer) History: Reports: None


Dermatologic History: Reports: Other (See Below)


Other Dermatologic History: scabbed rash on chest and left arm





- Infectious Disease History


Infectious Disease History: Reports: Chicken Pox, Shingles





- Past Surgical History


HEENT Surgical History: Reports: None


Cardiovascular Surgical History: Reports: Coronary Artery Bypass, Coronary 

Artery Stent, Pacer


Other Cardiovascular Surgeries/Procedures: 1993


GI Surgical History: Reports: None


Neurological Surgical History: Reports: Lumbar Spine, Thoracic Spine


Other Neurological Surgeries/Procedures: B feet:  decrease in sensation, no pain

 in LEs


Musculoskeletal Surgical History: Reports: Other (See Below)


Other Musculoskeletal Surgeries/Procedures:: laminectomy


Dermatological Surgical History: Reports: None





Social & Family History





- Family History


Family Medical History: No Pertinent Family History





- Tobacco Use


Tobacco Use Status *Q: Never Tobacco User


Second Hand Smoke Exposure: No





- Caffeine Use


Caffeine Use: Reports: Coffee





- Recreational Drug Use


Recreational Drug Use: No





H&P Review of Systems





- Review of Systems:


Review Of Systems: See Below


General: Reports: Malaise, Fatigue


HEENT: Reports: No Symptoms


Pulmonary: Reports: No Symptoms


Cardiovascular: Reports: No Symptoms


Gastrointestinal: Reports: Anorexia


Genitourinary: Reports: No Symptoms


Skin: Reports: Jaundice


Psychiatric: Reports: No Symptoms


Neurological: Reports: No Symptoms





Exam





- Exam


Exam: See Below





- Vital Signs


Vital Signs: 


                                Last Vital Signs











Temp  36.4 C   07/25/21 08:00


 


Pulse  91   07/25/21 08:10


 


Resp  18   07/25/21 08:00


 


BP  108/75   07/25/21 08:10


 


Pulse Ox  96   07/25/21 08:00











Weight: 66.678 kg





- Exam


General: Alert, Oriented


HEENT: PERRLA


Lungs: Clear to Auscultation, Normal Respiratory Effort


Cardiovascular: Regular Rate, Regular Rhythm


GI/Abdominal Exam: Normal Bowel Sounds, Soft, Non-Tender


Skin: Other (jaundice)


Neuro Extensive - Mental Status: Alert, Oriented x3, Normal Mood/Affect


Psychiatric: Alert, Normal Affect, Normal Mood





- Patient Data


Lab Results Last 24 hrs: 


                         Laboratory Results - last 24 hr











  07/24/21 07/24/21 07/24/21 Range/Units





  12:51 13:00 13:00 


 


WBC   6.7   (4.0-11.0)  K/uL


 


RBC   3.98 L   (4.50-6.50)  M/uL


 


Hgb   12.7 L   (13.0-18.0)  g/dL


 


Hct   35.9 L   (40.0-54.0)  %


 


MCV   90   (76-96)  fL


 


MCH   31.9   (27.0-32.0)  pg


 


MCHC   35.4 H   (31.0-35.0)  g/dL


 


RDW   16.4 H   (11.0-16.0)  %


 


Plt Count   209  D   (150-400)  K/uL


 


MPV   12.5 H   (6.0-10.0)  fL


 


Sodium     (136-145)  mmol/L


 


Potassium     (3.5-5.1)  mmol/L


 


Chloride     ()  mmol/L


 


Carbon Dioxide     (21.0-32.0)  mmol/L


 


Anion Gap     (5.0-15.0)  mmol/L


 


BUN     (8-26)  mg/dL


 


Creatinine     (0.70-1.30)  mg/dL


 


Est Cr Clr Drug Dosing     mL/min


 


Estimated GFR (MDRD)     (>60)  MLS/MIN


 


BUN/Creatinine Ratio     (6-25)  


 


Glucose     ()  mg/dL


 


Calcium     (8.5-10.1)  mg/dL


 


Magnesium     (1.8-2.4)  mg/dL


 


Total Bilirubin     (0.0-1.0)  mg/dL


 


Direct Bilirubin     (0.0-0.3)  mg/dL


 


AST     (15-37)  U/L


 


ALT     (12-78)  U/L


 


Alkaline Phosphatase     ()  U/L


 


B-Natriuretic Peptide     (0-450)  pg/mL


 


Total Protein     (6.4-8.2)  g/dL


 


Albumin     (3.4-5.0)  g/dL


 


Globulin     (2.2-4.2)  g/dL


 


Albumin/Globulin Ratio     (0.8-2.0)  


 


Lipase    756 H*  ()  U/L


 


Urine Color  Yellow    


 


Urine Appearance  Clear    (CLEAR)  


 


Urine pH  6.5    (5.0-8.0)  


 


Ur Specific Gravity  1.020    (1.003-1.030)  


 


Urine Protein  100 H    (NEGATIVE)  mg/dL


 


Urine Glucose (UA)  Negative    (NEGATIVE)  mg/dL


 


Urine Ketones  Negative    (NEGATIVE)  mg/dL


 


Urine Occult Blood  Moderate H    (NEGATIVE)  


 


Urine Nitrite  Negative    (NEGATIVE)  


 


Urine Bilirubin  Moderate H    (NEGATIVE)  


 


Urine Urobilinogen  2.0 H    (0.2-1.0)  E.U./dL


 


Ur Leukocyte Esterase  Negative    (NEGATIVE)  


 


U Hyaline Cast (Auto)  Few    /HPF


 


Urine RBC  10-20 H    /HPF


 


Urine WBC  0-5 H    /HPF


 


Ur Squamous Epith Cells  Moderate    /HPF


 


SARS-CoV-2 RNA (YOHANNES)     (NEGATIVE)  














  07/24/21 07/24/21 07/24/21 Range/Units





  13:00 13:00 13:00 


 


WBC     (4.0-11.0)  K/uL


 


RBC     (4.50-6.50)  M/uL


 


Hgb     (13.0-18.0)  g/dL


 


Hct     (40.0-54.0)  %


 


MCV     (76-96)  fL


 


MCH     (27.0-32.0)  pg


 


MCHC     (31.0-35.0)  g/dL


 


RDW     (11.0-16.0)  %


 


Plt Count     (150-400)  K/uL


 


MPV     (6.0-10.0)  fL


 


Sodium  141    (136-145)  mmol/L


 


Potassium  2.1 L* D    (3.5-5.1)  mmol/L


 


Chloride  99    ()  mmol/L


 


Carbon Dioxide  32.3 H    (21.0-32.0)  mmol/L


 


Anion Gap  11.8    (5.0-15.0)  mmol/L


 


BUN  19    (8-26)  mg/dL


 


Creatinine  1.57 H D    (0.70-1.30)  mg/dL


 


Est Cr Clr Drug Dosing  31.01    mL/min


 


Estimated GFR (MDRD)  42 L    (>60)  MLS/MIN


 


BUN/Creatinine Ratio  12.1    (6-25)  


 


Glucose  151 H D    ()  mg/dL


 


Calcium  8.8    (8.5-10.1)  mg/dL


 


Magnesium   2.0   (1.8-2.4)  mg/dL


 


Total Bilirubin  5.3 H D    (0.0-1.0)  mg/dL


 


Direct Bilirubin   4.1 H   (0.0-0.3)  mg/dL


 


AST  140 H    (15-37)  U/L


 


ALT  120 H    (12-78)  U/L


 


Alkaline Phosphatase  391 H    ()  U/L


 


B-Natriuretic Peptide    5462 H D  (0-450)  pg/mL


 


Total Protein  7.5    (6.4-8.2)  g/dL


 


Albumin  2.5 L    (3.4-5.0)  g/dL


 


Globulin  5.0 H    (2.2-4.2)  g/dL


 


Albumin/Globulin Ratio  0.5 L    (0.8-2.0)  


 


Lipase     ()  U/L


 


Urine Color     


 


Urine Appearance     (CLEAR)  


 


Urine pH     (5.0-8.0)  


 


Ur Specific Gravity     (1.003-1.030)  


 


Urine Protein     (NEGATIVE)  mg/dL


 


Urine Glucose (UA)     (NEGATIVE)  mg/dL


 


Urine Ketones     (NEGATIVE)  mg/dL


 


Urine Occult Blood     (NEGATIVE)  


 


Urine Nitrite     (NEGATIVE)  


 


Urine Bilirubin     (NEGATIVE)  


 


Urine Urobilinogen     (0.2-1.0)  E.U./dL


 


Ur Leukocyte Esterase     (NEGATIVE)  


 


U Hyaline Cast (Auto)     /HPF


 


Urine RBC     /HPF


 


Urine WBC     /HPF


 


Ur Squamous Epith Cells     /HPF


 


SARS-CoV-2 RNA (YOHANNES)     (NEGATIVE)  














  07/24/21 07/25/21 Range/Units





  15:55 09:15 


 


WBC    (4.0-11.0)  K/uL


 


RBC    (4.50-6.50)  M/uL


 


Hgb    (13.0-18.0)  g/dL


 


Hct    (40.0-54.0)  %


 


MCV    (76-96)  fL


 


MCH    (27.0-32.0)  pg


 


MCHC    (31.0-35.0)  g/dL


 


RDW    (11.0-16.0)  %


 


Plt Count    (150-400)  K/uL


 


MPV    (6.0-10.0)  fL


 


Sodium   142  (136-145)  mmol/L


 


Potassium   2.3 L*  (3.5-5.1)  mmol/L


 


Chloride   102  ()  mmol/L


 


Carbon Dioxide   31.3  (21.0-32.0)  mmol/L


 


Anion Gap   11.0  (5.0-15.0)  mmol/L


 


BUN   14  D  (8-26)  mg/dL


 


Creatinine   1.19  D  (0.70-1.30)  mg/dL


 


Est Cr Clr Drug Dosing   40.91  mL/min


 


Estimated GFR (MDRD)   58 L  (>60)  MLS/MIN


 


BUN/Creatinine Ratio   11.8  (6-25)  


 


Glucose   148 H  ()  mg/dL


 


Calcium   8.5  (8.5-10.1)  mg/dL


 


Magnesium    (1.8-2.4)  mg/dL


 


Total Bilirubin    (0.0-1.0)  mg/dL


 


Direct Bilirubin    (0.0-0.3)  mg/dL


 


AST    (15-37)  U/L


 


ALT    (12-78)  U/L


 


Alkaline Phosphatase    ()  U/L


 


B-Natriuretic Peptide    (0-450)  pg/mL


 


Total Protein    (6.4-8.2)  g/dL


 


Albumin    (3.4-5.0)  g/dL


 


Globulin    (2.2-4.2)  g/dL


 


Albumin/Globulin Ratio    (0.8-2.0)  


 


Lipase    ()  U/L


 


Urine Color    


 


Urine Appearance    (CLEAR)  


 


Urine pH    (5.0-8.0)  


 


Ur Specific Gravity    (1.003-1.030)  


 


Urine Protein    (NEGATIVE)  mg/dL


 


Urine Glucose (UA)    (NEGATIVE)  mg/dL


 


Urine Ketones    (NEGATIVE)  mg/dL


 


Urine Occult Blood    (NEGATIVE)  


 


Urine Nitrite    (NEGATIVE)  


 


Urine Bilirubin    (NEGATIVE)  


 


Urine Urobilinogen    (0.2-1.0)  E.U./dL


 


Ur Leukocyte Esterase    (NEGATIVE)  


 


U Hyaline Cast (Auto)    /HPF


 


Urine RBC    /HPF


 


Urine WBC    /HPF


 


Ur Squamous Epith Cells    /HPF


 


SARS-CoV-2 RNA (YOHANNES)  Negative   (NEGATIVE)  











Result Diagrams: 


                                 07/24/21 13:00





                                 07/25/21 09:15





Sepsis Event Note





- Evaluation


Sepsis Screening Result: No Definite Risk





- Focused Exam


Vital Signs: 


                                   Vital Signs











  Temp Pulse Pulse Resp BP BP Pulse Ox


 


 07/25/21 08:10   91    108/75  


 


 07/25/21 08:00  36.4 C   91  18   108/75  96


 


 07/25/21 04:03  36.6 C    14   112/74  95


 


 07/25/21 00:00     16   














- Problem List


(1) Acute hypokalemia


SNOMED Code(s): 53951554


   ICD Code: E87.6 - HYPOKALEMIA   Status: Acute   Priority: Medium   Current 

Visit: No   





(2) Dehydration


SNOMED Code(s): 88154418


   ICD Code: E86.0 - DEHYDRATION   Status: Acute   Priority: Medium   Current 

Visit: No   





(3) Jaundice


SNOMED Code(s): 18120378


   ICD Code: R17 - UNSPECIFIED JAUNDICE   Status: Acute   Priority: Medium   

Current Visit: No   





(4) Pancreatic cancer


SNOMED Code(s): 258281930


   ICD Code: C25.9 - MALIGNANT NEOPLASM OF PANCREAS, UNSPECIFIED   Status: Acute

   Priority: Medium   Current Visit: No   


Qualifiers: 


   Pancreatic malignancy location: body of pancreas   Qualified Code(s): C25.1 -

 Malignant neoplasm of body of pancreas   


Problem List Initiated/Reviewed/Updated: Yes


Orders Last 24hrs: 


                               Active Orders 24 hr











 Category Date Time Status


 


 Patient Status [ADT] Routine ADT  07/24/21 14:51 Active


 


 Oxygen Therapy [RC] PRN Care  07/24/21 14:51 Active


 


 Telemetry Monitoring [Cardiac Monitoring] [RC] 08,20 Care  07/24/21 19:05 

Active


 


 Vital Signs [RC] 00,04,08,12,16,20 Care  07/24/21 14:51 Active


 


 Regular Diet [DIET] Diet  07/24/21 Dinner Ordered


 


 Abdomen Pelvis wo Cont [CT] Stat Exams  07/24/21 13:46 Taken


 


 CULTURE MRSA [RM] Routine Lab  07/24/21 16:31 Received


 


 Apixaban [Eliquis] Med  07/24/21 20:00 Active





 5 mg PO BID   


 


 Aspirin Med  07/25/21 08:00 Active





 81 mg PO DAILY   


 


 Famotidine [Pepcid] Med  07/24/21 20:00 Active





 20 mg PO BID   


 


 Lactated Ringers [Ringers, Lactated] 1,000 ml Med  07/24/21 15:15 Active





 IV ASDIRECTED   


 


 Levothyroxine [Synthroid] Med  07/25/21 07:00 Active





 100 mcg PO ACBREAKFAST   


 


 Metoprolol Tartrate [Lopressor] Med  07/24/21 20:00 Active





 25 mg PO BID   


 


 Patient's Own Medication [Ptom] Med  07/24/21 20:00 Active





 0 each EYELF BEDTIME   


 


 Potassium Chloride Riders [KCl in Water 10 MEQ/50 ML] Med  07/25/21 10:00 

Active





 10 meq   





 Premix Bag 1 bag   





 IV Q6H   


 


 Potassium Chloride [Klor-Con 10] Med  07/24/21 20:00 Active





 10 meq PO BEDTIME   


 


 Sodium Chloride 0.9% [Saline Flush] Med  07/24/21 19:13 Active





 10 ml FLUSH ASDIRECTED PRN   


 


 Tamsulosin [Flomax] Med  07/24/21 20:00 Active





 0.4 mg PO BEDTIME   


 


 atorvaSTATin [Lipitor] Med  07/24/21 20:00 Active





 40 mg PO BEDTIME   


 


 Peripheral IV Insertion Adult [OM.PC] Routine Oth  07/24/21 19:13 Ordered


 


 Resuscitation Status Routine Resus Stat  07/24/21 21:23 Ordered








                                Medication Orders





Apixaban (Apixaban 5 Mg Tab)  5 mg PO BID [facility]


   Last Admin: 07/25/21 08:10  Dose: 5 mg


   Documented by: CAMERON


   Admin: 07/24/21 19:35  Dose: 5 mg


   Documented by: WILLIAM


Aspirin (Aspirin 81 Mg Tab.Chew)  81 mg PO DAILY [facility]


   Last Admin: 07/25/21 08:10  Dose: 81 mg


   Documented by: CAMERON


Atorvastatin Calcium (Atorvastatin 40 Mg Tab)  40 mg PO BEDTIME [facility]


   Last Admin: 07/24/21 19:34  Dose: 40 mg


   Documented by: WILLIAM


Famotidine (Famotidine 20 Mg Tab)  20 mg PO BID [facility]


   Last Admin: 07/25/21 08:09  Dose: 20 mg


   Documented by: CAMERON


   Admin: 07/24/21 19:35  Dose: 20 mg


   Documented by: WILLIAM


Lactated Ringer's (Ringers, Lactated)  1,000 mls @ 100 mls/hr IV ASDIRECTED [facility]


   Last Admin: 07/25/21 04:11  Dose: 100 mls/hr


   Documented by: WILLIAM


   Infusion: 07/25/21 04:11  Dose: 100 mls/hr


   Documented by: WILLIAM


   Admin: 07/24/21 18:41  Dose: 100 mls/hr


   Documented by: CAMERON


Potassium Chloride 10 meq/ (Premix)  50 mls @ 50 mls/hr IV Q6H [facility]


   Stop: 07/26/21 10:59


   Last Admin: 07/25/21 10:10  Dose: 50 mls/hr


   Documented by: CAMERON


Levothyroxine Sodium (Levothyroxine 100 Mcg Tab)  100 mcg PO ACBREAKFAST [facility]


   Last Admin: 07/25/21 06:24  Dose: 100 mcg


   Documented by: WILLIAM


Metoprolol Tartrate (Metoprolol Tartrate 25 Mg Tab)  25 mg PO BID [facility]


   Last Admin: 07/25/21 08:10  Dose: 25 mg


   Documented by: CAMERON


   Admin: 07/24/21 19:34  Dose: 25 mg


   Documented by: WILLIAM


Patient's Own Medication**Timolol 0.25% Ophth Soln**  0 each EYELF BEDTIME [facility]


   Last Admin: 07/24/21 20:45  Dose: 1 each


   Documented by: WILLIAM


Potassium Chloride (Potassium Chloride 10 Meq Tab.Er)  10 meq PO BEDTIME [facility]


   Last Admin: 07/24/21 19:34  Dose: 10 meq


   Documented by: WILLIAM


Sodium Chloride (Sodium Chloride 0.9% 10 Ml Syringe)  10 ml FLUSH ASDIRECTED PRN


   PRN Reason: Keep Vein Open


Tamsulosin HCl (Tamsulosin 0.4 Mg Cap.Er)  0.4 mg PO BEDTIME [facility]


   Last Admin: 07/24/21 19:34  Dose: 0.4 mg


   Documented by: WILLIAM








Assessment/Plan Comment:: 





1- dehydration: 


gentle IVF hydration due to a h/o CFH





2- Acute hypokalemia: 


K replacement bu IV and PO 


daily labs check





3- jaundice: 


2/2 pancreatic CA. Will contact cancer center [place] on Monday for 

arrangement. Plavix was held for anticipation of ERCP/biopsy procedure. but 

still on Eliquis. Has a pacemaker.





4- weight loss and cachexia: 


2/2 above





5- h/o CHF: 


resume home meds as before 





anticipate another 24-36hrs in the hospital 








- Mortality Measure


Prognosis:: Good

## 2021-07-25 NOTE — CT
Date of Service:  07/24/21

Clinical Data:  jaundice / weight loss



UNENHANCED ABDOMEN AND PELVIC CT:  



Multislice acquisition through the abdomen and pelvis without IV or oral 
contrast was performed.  



No priors.  



There are emphysematous changes in both lower lungs with atelectatic changes in 
both lung bases.  



The heart is enlarged.  Patient is status pos  coronary artery bypass grafting. 




There is diffuse gastric wall thickening.  This is probably related to 
nondistention.  Gastritis should be considered.  



The liver is normal size.  There are multiple subcentimeter low density lesions 
within the liver.  These may be multiple cysts.  Through density metastatic 
disease should at least be considered.  



The gallbladder is mildly distended.  No calcified gallstones.  No 
pericholecystic fluid.  There is marked intrahepatic biliary duct dilatation.  
The common bile duct is also dilated and measures 1.4 cm in diameter.  A distal 
obstruction is suspected.  



There is atrophy of the pancreas.  There is a masslike lesion noted within the 
body of the pancreas measuring 2.6 cm in diameter.  Pancreatic carcinoma should 
be considered.  No pancreatic duct dilatation.  



The right and left adrenals appear normal.  



The right and left kidneys are mildly atrophic, otherwise normal.  No 
nephrocalcinosis or nephrolithiasis.  No hydronephrosis or hydroureter.  



The bladder is fluid filled.  It appears normal.  



The prostate is enlarged.  There are calcifications within the prostate 
consistent with chronic prostatitis.  



There are scattered air-fluid levels within the small bowel and colon.  No 
dilated loops of bowel.  Enterocolitis should be considered  



No evidence of appendicitis.  



No free air.  No free fluid.  No adenopathy.  No dilated loops of bowel.  



There is degenerative disk disease throughout the thoracic and lumbar spine . 



IMPRESSION:  Abnormal exam.  See above.  



[id number]

MTDD

## 2021-07-26 NOTE — DISCH
HOSPITAL COURSE:  An 81-year-old male was admitted, with jaundice, abdominal pain,

hypokalemia, and dehydration.  His potassium level started out at 2.1.  He did go up to

normal yesterday, but today it dropped back from 3.2 down to 2.6.  The patient is feeling

better.  He has been rehydrated.  CT of the abdomen shows a pancreatic mass.  The patient's

appetite and activity levels have been improving since being admitted.  Oncology was

consulted this morning and they will receive all of his information by fax and will contact

the patient to schedule an appointment for further workup involving the pancreatic mass.

The patient wants to go home today and I feel this is reasonable.  He will be discharged on

K-Dur 40 mEq b.i.d. for 2 days.  He is to come back for a recheck basic metabolic panel in 2

days.  He is to go home and rest.  Activity as tolerated.

 

PHYSICAL EXAMINATION:

VITAL SIGNS:  Today are good.  He is afebrile.  Blood pressure 124/80, pulse 86, O2 sats 96%

on room air.  LUNGS:  Clear.  CARDIAC:  Heart sounds distinct without murmurs.  ABDOMEN:

Soft, nontender.  SKIN:  Warm and dry and just slightly jaundiced at this point.

 

The patient is happy about being discharged.  He is here with one of his daughters at this

point.  They have no further questions and agree to await the phone call from Oncology.

 

 

WHIT/MODL

DD:  07/26/2021 12:32:51

DT:  07/26/2021 21:24:21

Job #:  [id number]

## 2021-08-31 NOTE — CR
CLINICAL DATA:  Right ankle pain.



RIGHT ANKLE, 31 AUGUST 2021:



There is soft tissue swelling adjacent to the ankle joint.  



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 
other joints.  There is a posterior calcaneal spur.  



No acute fracture or dislocation.  



There are vascular calcifications in the soft tissues.  



Job:  [id number]

[facility]

## 2021-08-31 NOTE — EDM.PDOC
ED HPI GENERAL MEDICAL PROBLEM





- General


Chief Complaint: Chest Pain


Stated Complaint: FELL / HIT HEAD ON CONCRETE


Time Seen by Provider: 08/31/21 08:39


Source of Information: Reports: EMS





- History of Present Illness


INITIAL COMMENTS - FREE TEXT/NARRATIVE: 


Pt presented to ED by EMS, after a fall from toilet, due to multiple 

defibrillations by his ICD/pacer. Per EMS 6 defibrillations prior to EMS arrival

on seen and one in their presence. Pt had a LOC and struck his head.


Onset: Sudden


Onset Date: 08/31/21


Onset Time: 08:00 (approx)


Location: Reports: Head, Chest


Quality: Reports: Other (Electrical shocks from ICD)


Severity: Severe


Context: Reports: Trauma


Associated Symptoms: Reports: Syncope





- Related Data


                                    Allergies











Allergy/AdvReac Type Severity Reaction Status Date / Time


 


No Known Allergies Allergy   Verified 08/31/21 12:56











Home Meds: 


                                    Home Meds





Apixaban [Eliquis] 5 mg PO BID 09/09/18 [History]


atorvaSTATin [Lipitor] 40 mg PO BEDTIME 11/02/18 [History]


Acetaminophen [Tylenol Arthritis Pain] 650 mg PO Q4H PRN  tab.er 11/12/18 [Rx]


Clopidogrel [Plavix] 75 mg PO DAILY  tablet 11/12/18 [Rx]


Levothyroxine Sodium [Synthroid] 100 mcg PO ACBREAKFAST 30 Days #30 tab 11/12/18

[Rx]


Nitroglycerin [Nitrostat] 0.4 mg SL Q5M PRN  tab.sl 11/12/18 [Rx]


Tamsulosin [Flomax] 0.4 mg PO BEDTIME 30 Days #30 cap.er 11/12/18 [Rx]


Famotidine [Pepcid] 20 mg PO BID 02/11/19 [History]


Potassium Chloride [Klor-Con] 40 meq PO BID 02/11/19 [History]


Aspirin 81 mg PO DAILY 07/24/21 [History]


Bumetanide [Bumex] 1 mg PO ASDIRECTED 07/24/21 [History]


Metoprolol Tartrate 25 mg PO BID 07/24/21 [History]


timoloL maleate [Timoptic 0.25% Ophth Soln] 1 drop EYELF BEDTIME 07/24/21 

[History]











Past Medical History


HEENT History: Reports: Glaucoma, Impaired Vision


Cardiovascular History: Reports: Afib, Bypass, High Cholesterol, Hypertension, 

MI, Pacemaker


Other Cardiovascular History: cardiac arrest 10/18 pacemaker, 2 recent stents, 

automatic defibrillater


Respiratory History: Reports: COPD


Gastrointestinal History: Reports: GERD


Musculoskeletal History: Reports: Back Pain, Chronic


Neurological History: Reports: Vertigo


Other Neuro History: Occ. Headaches


Endocrine/Metabolic History: Reports: Hypoparathyroidism


Hematologic History: Reports: Anticoagulation Therapy


Oncologic (Cancer) History: Reports: None


Dermatologic History: Reports: Other (See Below)


Other Dermatologic History: scabbed rash on chest and left arm





- Infectious Disease History


Infectious Disease History: Reports: Chicken Pox, Shingles





- Past Surgical History


HEENT Surgical History: Reports: None


Cardiovascular Surgical History: Reports: Coronary Artery Bypass, Coronary 

Artery Stent, Pacer


Other Cardiovascular Surgeries/Procedures: 1993


GI Surgical History: Reports: None


Neurological Surgical History: Reports: Lumbar Spine, Thoracic Spine


Other Neurological Surgeries/Procedures: B feet:  decrease in sensation, no pain

 in LEs


Musculoskeletal Surgical History: Reports: Other (See Below)


Other Musculoskeletal Surgeries/Procedures:: laminectomy


Dermatological Surgical History: Reports: None





Social & Family History





- Family History


Family Medical History: No Pertinent Family History





- Caffeine Use


Caffeine Use: Reports: Coffee





ED ROS GENERAL





- Review of Systems


Review Of Systems: See Below


Constitutional: Reports: Malaise, Weakness


HEENT: Reports: No Symptoms


Respiratory: Reports: No Symptoms


Cardiovascular: Reports: Other (discomfort from defib)


Endocrine: Reports: No Symptoms


GI/Abdominal: Reports: No Symptoms


: Reports: No Symptoms


Musculoskeletal: Reports: No Symptoms


Skin: Reports: Pallor


Neurological: Reports: No Symptoms


Psychiatric: Reports: No Symptoms


Hematologic/Lymphatic: Reports: No Symptoms


Immunologic: Reports: No Symptoms





ED EXAM, GENERAL





- Physical Exam


Exam: See Below


Free Text/Narrative:: 





Upon arrival pt arrived in the ED looking very pale, but AO x 3 . 

Speaking in 2-3 word sentences without difficulty. Pt EKG show intermittent long

 runs of VTACH.


Exam Limited By: No Limitations


General Appearance: Alert, WD/WN, Mild Distress


Eye Exam: Bilateral Eye: EOMI, PERRL


Ears: Normal External Exam, Hearing Grossly Normal


Ear Exam: Bilateral Ear: Auricle Normal


Nose: Normal Inspection, No Blood


Throat/Mouth: Normal Lips, Normal Voice


Head: Normocephalic, Other (2" abrasion above left eyebrow)


Neck: Normal Inspection, Supple, Non-Tender, Full Range of Motion


Respiratory/Chest: No Respiratory Distress, Lungs Clear, Normal Breath Sounds, 

No Accessory Muscle Use, Chest Non-Tender


Cardiovascular: Tachycardia, Extra Beats, Other (paced rhythm)


Peripheral Pulses: 2+: Carotid (L), Carotid (R), Radial (L), Radial (R)


GI/Abdominal: Soft, Non-Tender, No Organomegaly, No Distention, No Mass


 (Male) Exam: Other (NA)


Rectal (Males) Exam: Other (NA)


Back Exam: Other (visually normal)


Extremities: Other (Rt ankle swelling and pain)


Neurological: Alert, Oriented, Normal Cognition


Psychiatric: Normal Affect, Normal Mood


Skin Exam: Dry, Cool, Pallor


Lymphatic: No Adenopathy





ED CARDIOLOGY PROCEDURES





- Additional/Other Procedure(s)


Other (Free Text) Procedure(s): 


Subclavian central line was place by Adam KANG and Joel GONZALES, xray was obtained 

to confirm proper position.





  ** #1 Interpretation


Rhythm: Other (Runs of VTACH)


EKG Interpretation Comments: 





NSR, VTACH, and paced all occurring intermittently 





Course





- Orders/Labs/Meds


Orders: 


                               Active Orders 24 hr











 Category Date Time Status


 


 Patient Status Manage Transfer [TRANSFER] Routine ADT  08/31/21 14:13 Active


 


 Ankle Min 3V Rt [CR] Stat Exams  08/31/21 14:49 Ordered


 


 Chest 1V Frontal [CR] Stat Exams  08/31/21 13:56 Taken


 


 Amiodarone [Cordarone] 450 mg Med  08/31/21 12:15 Active





 Dextrose 5% in Water 250 ml   





 IV ASDIRECTED   


 


 Resuscitation Status Routine Resus Stat  08/31/21 14:23 Ordered








                                Medication Orders





Amiodarone HCl 450 mg/ (Dextrose/Water)  259 mls @ 33.3 mls/hr IV ASDIRECTED HOWARD


   Last Admin: 08/31/21 12:32  Dose: 41.37 mls/hr


   Documented by: MARINA








Labs: 


                                Laboratory Tests











  08/31/21 08/31/21 08/31/21 Range/Units





  09:28 09:28 11:15 


 


WBC  5.9    (4.0-11.0)  K/uL


 


RBC  3.39 L    (4.50-6.50)  M/uL


 


Hgb  11.1 L    (13.0-18.0)  g/dL


 


Hct  34.9 L    (40.0-54.0)  %


 


MCV  103 H    (76-96)  fL


 


MCH  32.7 H    (27.0-32.0)  pg


 


MCHC  31.8    (31.0-35.0)  g/dL


 


RDW  15.5    (11.0-16.0)  %


 


Plt Count  291  D    (150-400)  K/uL


 


MPV  10.9 H    (6.0-10.0)  fL


 


Neut % (Auto)  73.0 H    (45.0-70.0)  %


 


Lymph % (Auto)  12.2 L    (20.0-40.0)  %


 


Mono % (Auto)  9.3    (3.0-10.0)  %


 


Eos % (Auto)  5.2 H    (1.0-5.0)  %


 


Baso % (Auto)  0.3    (0.0-0.5)  %


 


Neut # (Auto)  4.32    (2.00-7.50)  K/uL


 


Lymph # (Auto)  0.72 L    (1.50-4.00)  K/uL


 


Mono # (Auto)  0.55    (0.20-0.80)  K/uL


 


Eos # (Auto)  0.31    (0.04-0.40)  K/uL


 


Baso # (Auto)  0.02    (0.02-0.10)  K/uL


 


Sodium   143   (136-145)  mmol/L


 


Potassium   3.5   (3.5-5.1)  mmol/L


 


Chloride   110 H   ()  mmol/L


 


Carbon Dioxide   23.9   (21.0-32.0)  mmol/L


 


Anion Gap   12.6   (5.0-15.0)  mmol/L


 


BUN   8  D   (8-26)  mg/dL


 


Creatinine   0.86  D   (0.70-1.30)  mg/dL


 


Est Cr Clr Drug Dosing   TNP   


 


Estimated GFR (MDRD)   > 60   (>60)  MLS/MIN


 


BUN/Creatinine Ratio   9.3   (6-25)  


 


Glucose   123 H   ()  mg/dL


 


Calcium   8.7   (8.5-10.1)  mg/dL


 


Total Bilirubin   1.2 H D   (0.0-1.0)  mg/dL


 


AST   19   (15-37)  U/L


 


ALT   18   (12-78)  U/L


 


Alkaline Phosphatase   99   ()  U/L


 


Troponin I   0.112 H* D   (0.000-0.060)  ng/mL


 


Total Protein   6.9   (6.4-8.2)  g/dL


 


Albumin   2.5 L   (3.4-5.0)  g/dL


 


Globulin   4.4 H   (2.2-4.2)  g/dL


 


Albumin/Globulin Ratio   0.6 L   (0.8-2.0)  


 


Urine Color    Yellow  


 


Urine Appearance    Clear  (CLEAR)  


 


Urine pH    5.5  (5.0-8.0)  


 


Ur Specific Gravity    >= 1.030  (1.003-1.030)  


 


Urine Protein    30 H  (NEGATIVE)  mg/dL


 


Urine Glucose (UA)    Negative  (NEGATIVE)  mg/dL


 


Urine Ketones    Negative  (NEGATIVE)  mg/dL


 


Urine Occult Blood    Negative  (NEGATIVE)  


 


Urine Nitrite    Negative  (NEGATIVE)  


 


Urine Bilirubin    Negative  (NEGATIVE)  


 


Urine Urobilinogen    0.2  (0.2-1.0)  E.U./dL


 


Ur Leukocyte Esterase    Negative  (NEGATIVE)  


 


Urine RBC    0-5 H  /HPF


 


Urine WBC    0-5 H  /HPF


 


Ur Squamous Epith Cells    Few  /HPF


 


SARS-CoV-2 RNA (YOHANNES)     (NEGATIVE)  














  08/31/21 Range/Units





  11:30 


 


WBC   (4.0-11.0)  K/uL


 


RBC   (4.50-6.50)  M/uL


 


Hgb   (13.0-18.0)  g/dL


 


Hct   (40.0-54.0)  %


 


MCV   (76-96)  fL


 


MCH   (27.0-32.0)  pg


 


MCHC   (31.0-35.0)  g/dL


 


RDW   (11.0-16.0)  %


 


Plt Count   (150-400)  K/uL


 


MPV   (6.0-10.0)  fL


 


Neut % (Auto)   (45.0-70.0)  %


 


Lymph % (Auto)   (20.0-40.0)  %


 


Mono % (Auto)   (3.0-10.0)  %


 


Eos % (Auto)   (1.0-5.0)  %


 


Baso % (Auto)   (0.0-0.5)  %


 


Neut # (Auto)   (2.00-7.50)  K/uL


 


Lymph # (Auto)   (1.50-4.00)  K/uL


 


Mono # (Auto)   (0.20-0.80)  K/uL


 


Eos # (Auto)   (0.04-0.40)  K/uL


 


Baso # (Auto)   (0.02-0.10)  K/uL


 


Sodium   (136-145)  mmol/L


 


Potassium   (3.5-5.1)  mmol/L


 


Chloride   ()  mmol/L


 


Carbon Dioxide   (21.0-32.0)  mmol/L


 


Anion Gap   (5.0-15.0)  mmol/L


 


BUN   (8-26)  mg/dL


 


Creatinine   (0.70-1.30)  mg/dL


 


Est Cr Clr Drug Dosing   


 


Estimated GFR (MDRD)   (>60)  MLS/MIN


 


BUN/Creatinine Ratio   (6-25)  


 


Glucose   ()  mg/dL


 


Calcium   (8.5-10.1)  mg/dL


 


Total Bilirubin   (0.0-1.0)  mg/dL


 


AST   (15-37)  U/L


 


ALT   (12-78)  U/L


 


Alkaline Phosphatase   ()  U/L


 


Troponin I   (0.000-0.060)  ng/mL


 


Total Protein   (6.4-8.2)  g/dL


 


Albumin   (3.4-5.0)  g/dL


 


Globulin   (2.2-4.2)  g/dL


 


Albumin/Globulin Ratio   (0.8-2.0)  


 


Urine Color   


 


Urine Appearance   (CLEAR)  


 


Urine pH   (5.0-8.0)  


 


Ur Specific Gravity   (1.003-1.030)  


 


Urine Protein   (NEGATIVE)  mg/dL


 


Urine Glucose (UA)   (NEGATIVE)  mg/dL


 


Urine Ketones   (NEGATIVE)  mg/dL


 


Urine Occult Blood   (NEGATIVE)  


 


Urine Nitrite   (NEGATIVE)  


 


Urine Bilirubin   (NEGATIVE)  


 


Urine Urobilinogen   (0.2-1.0)  E.U./dL


 


Ur Leukocyte Esterase   (NEGATIVE)  


 


Urine RBC   /HPF


 


Urine WBC   /HPF


 


Ur Squamous Epith Cells   /HPF


 


SARS-CoV-2 RNA (YOHANNES)  Negative  (NEGATIVE)  











Meds: 


Medications











Generic Name Dose Route Start Last Admin





  Trade Name Freq  PRN Reason Stop Dose Admin


 


Amiodarone HCl 450 mg/  259 mls @ 33.3 mls/hr  08/31/21 12:15  08/31/21 12:32





  Dextrose/Water  IV   41.37 mls/hr





  ASDIRECTED HOWARD   Administration














Discontinued Medications














Generic Name Dose Route Start Last Admin





  Trade Name Freq  PRN Reason Stop Dose Admin


 


Amiodarone HCl 150 mg/  53 mls @ 200 mls/hr  08/31/21 09:00 





  Dextrose/Water  IV  08/31/21 12:00 





  ASDIRECTED HOWARD  














Departure





- Departure


Time of Disposition: 14:40


Disposition: Admitted As Inpatient 64


Condition: Good


Clinical Impression: 


 Ventricular tachyarrhythmia





Instructions:  Ventricular Tachycardia


Referrals: 


PCP,None [Primary Care Provider] - 


Forms:  ED Department Discharge





- My Orders


Last 24 Hours: 


My Active Orders





08/31/21 14:13


Patient Status Manage Transfer [TRANSFER] Routine 





08/31/21 14:23


Resuscitation Status Routine 





08/31/21 14:49


Ankle Min 3V Rt [CR] Stat 














- Assessment/Plan


Last 24 Hours: 


My Active Orders





08/31/21 14:13


Patient Status Manage Transfer [TRANSFER] Routine 





08/31/21 14:23


Resuscitation Status Routine 





08/31/21 14:49


Ankle Min 3V Rt [CR] Stat 











Assessment:: 


Pt was treated for a fall from toilet and VTACH. Pt was given 150 mg of 

amiodarone in ED to address his freq runs of VTACH after consultation with [facility] 

via interosseous access. Pt was then started on a loading infusion plan per Dr. Stevens . Amiodarone: 1mg/min for 6hr then 0.5mg/min 18hr then 

switch to oral tablets 400mg twice daily for 2 day, if tx tolerated well 

discharge patient to home on 400mg once daily.


Plan: 





See above

## 2021-08-31 NOTE — CT
DATE OF SERVICE:  08/31/21

CLINICAL DATA:  fall



UNENHANCED BRAIN CT:  



Multislice axial acquisition was performed. 



No priors.



There is diffuse atrophy.  There are periventricular lucencies bilaterally 
consistent with small vessel ischemic change.  



No masses or mass effect.  No intracranial hemorrhage.  No evidence of acute or 
subacute infarct.  



There is mucosal thickening in the right maxillary, ethmoid, and left sphenoid 
sinuses.  There is opacification of the right sphenoid sinus.  These findings 
are consistent with sinus disease. 



No other significant findings. 



IMPRESSION:  No acute intracranial abnormalities. 



[id number]

[facility]

## 2021-09-01 NOTE — CR
DATE OF SERVICE:  08/31/2021

CLINICAL DATA:  Central line placement



AP portable chest:



Comparison is made to a prior exam dated 11 February 2019.



The patient has taken a very poor inspiration.



The cardiac pacer and pacer wires remain unchanged in position. The patient is 
status post median sternotomy. The heart remains enlarged, unchanged. There is 
calcification of the aortic arch.



There is a right subclavian central venous catheter in place with its distal tip
in the region of the right atrium.



The lungs are clear. No pneumothorax. No pleural effusions. No areas of 
consolidation.

MTDD

## 2021-09-02 NOTE — PCM.PN
- General Info


Date of Service: 09/01/21


Admission Dx/Problem (Free Text): 





Ventricular tachycardia


Fall


Subjective Update: 





This patient was received in signout this morning.  He was admitted to the 

hospital for management of ventricular tachycardia and was placed on an 

amiodarone drip.  He has continued to do well with this, remaining in normal 

sinus rhythm with a normal rate.  He will continue to receive this amiodarone IV

through the day.  Later this afternoon he will be started on oral amiodarone 

with continued cardiovascular monitoring.  He is hemodynamically stable with 

vital signs that are largely unchanged.  He denies any chest pain, difficulty 

breathing or shortness of breath.  He does have some intermittent and mild 

abdominal pain, primarily in the right upper quadrant.  He is have some appetite

although was not taking much orally.  He will take fluids better than solid fo

ods at this time.  He has not had a fever.


Functional Status: Reports: Pain Controlled





- Review of Systems


General: Reports: Weakness.  Denies: Fever


HEENT: Reports: No Symptoms


Pulmonary: Denies: Shortness of Breath, Cough


Cardiovascular: Denies: Chest Pain, Palpitations, Dyspnea on Exertion, 

Orthopnea, Lightheadedness


Musculoskeletal: Reports: No Symptoms


Skin: Reports: No Symptoms


Neurological: Reports: No Symptoms





- Patient Data


Vitals - Most Recent: 


                                Last Vital Signs











Temp  36.6 C   09/02/21 12:29


 


Pulse  86   09/02/21 17:00


 


Resp  28 H  09/02/21 17:00


 


BP  124/75   09/02/21 17:00


 


Pulse Ox  93 L  09/02/21 17:00











Weight - Most Recent: 63.503 kg


I&O - Last 24 Hours: 


                                 Intake & Output











 09/02/21 09/02/21 09/02/21





 06:59 14:59 22:59


 


Intake Total 600  


 


Balance 600  











Med Orders - Current: 


                               Current Medications





Acetaminophen (Acetaminophen 325 Mg Tab)  650 mg PO Q4H PRN


   PRN Reason: Pain (Mild 1-3)/fever


Acetaminophen (Acetaminophen 650 Mg Tab.Er)  650 mg PO Q4H PRN


   PRN Reason: Pain (severe 7-10)


Al Hydroxide/Mg Hydroxide (Aluminum Hydroxide/Magnesium Hydroxide/Simethicone 

Susp 30 Ml Cup)  30 ml PO Q4H PRN


   PRN Reason: Indigestion


   Last Admin: 09/01/21 17:35 Dose:  30 ml


   Documented by: 


Amiodarone HCl (Amiodarone 200 Mg Tab)  400 mg PO BID [facility]


   Last Admin: 09/02/21 08:25 Dose:  400 mg


   Documented by: 


Apixaban (Apixaban 5 Mg Tab)  5 mg PO BID [facility]


   Last Admin: 09/02/21 08:25 Dose:  5 mg


   Documented by: 


Aspirin (Aspirin 81 Mg Tab.Chew)  81 mg PO DAILY [facility]


   Last Admin: 09/02/21 08:25 Dose:  81 mg


   Documented by: 


Atorvastatin Calcium (Atorvastatin 40 Mg Tab)  40 mg PO BEDTIME [facility]


   Last Admin: 09/01/21 20:22 Dose:  40 mg


   Documented by: 


Bisacodyl (Bisacodyl 10 Mg Supp)  10 mg RECTAL DAILY PRN


   PRN Reason: Constipation


   Last Admin: 09/02/21 09:40 Dose:  10 mg


   Documented by: 


Bumetanide (Bumetanide 1 Mg Tab)  1 mg PO DAILY PRN


   PRN Reason: Edema


Docusate Sodium (Docusate Sodium 100 Mg Cap)  100 mg PO DAILY [facility]


   Last Admin: 09/02/21 08:25 Dose:  100 mg


   Documented by: 


Famotidine (Famotidine 20 Mg Tab)  20 mg PO BID [facility]


   Last Admin: 09/02/21 08:24 Dose:  20 mg


   Documented by: 


Hydroxyzine HCl (Hydroxyzine Hcl 25 Mg Tab)  25 mg PO QID [facility]


   Last Admin: 09/02/21 12:09 Dose:  Not Given


   Documented by: 


Sodium Chloride (Normal Saline)  1,000 mls @ 50 mls/hr IV ASDIRECTED [facility]


   Last Admin: 09/01/21 18:15 Dose:  50 mls/hr


   Documented by: 


Levothyroxine Sodium (Levothyroxine 100 Mcg Tab)  100 mcg PO ACBREAKFAST [facility]


   Last Admin: 09/02/21 06:31 Dose:  100 mcg


   Documented by: 


Magnesium Hydroxide (Magnesium Hydroxide 400 Mg/5 Ml Susp 30 Ml Cup)  30 ml PO 

DAILY PRN


   PRN Reason: Constipation


   Last Admin: 09/01/21 18:15 Dose:  30 ml


   Documented by: 


Metoprolol Tartrate (Metoprolol Tartrate 25 Mg Tab)  25 mg PO BID [facility]


   Last Admin: 09/02/21 08:25 Dose:  25 mg


   Documented by: 


Mometasone Furoate/Formoterol Fumar (Formoterol/Mometasone 200-5 Mcg 8.8 Gm 

Inhaler)  1 puff IH BID [facility]


Morphine Sulfate (Morphine Oral Concentrate 20 Mg/Ml 30 Ml Bottle)  5 - 10 mg PO

Q4H PRN


   PRN Reason: Pain


Nitroglycerin (Nitroglycerin 0.4 Mg Tab.Sl)  0.4 mg SL Q5M PRN


   PRN Reason: Chest Pain


Non-Formulary Medication (Timolol Maleate [Timoptic 0.25% Ophth Soln])  1 drop 

EYELF BEDTIME [facility]


   Last Admin: 09/01/21 20:34 Dose:  Not Given


   Documented by: 


Ondansetron HCl (Ondansetron 4 Mg Tab.Dis)  4 mg PO Q4HR PRN


   PRN Reason: Nausea


Potassium Chloride (Potassium Chloride 20 Meq Tab.Er)  20 meq PO BID [facility]


   Last Admin: 09/02/21 08:24 Dose:  20 meq


   Documented by: 


Tamsulosin HCl (Tamsulosin 0.4 Mg Cap.Er)  0.4 mg PO BEDTIME [facility]


   Last Admin: 09/01/21 20:21 Dose:  0.4 mg


   Documented by: 





Discontinued Medications





Al Hydroxide/Mg Hydroxide (Aluminum Hydroxide/Magnesium Hydroxide/Simethicone 

Susp 30 Ml Cup) Confirm Administered Dose 30 ml .ROUTE .[facility]-MED ONE


   Stop: 09/01/21 17:31


   Last Admin: 09/01/21 17:55 Dose:  Not Given


   Documented by: 


Bumetanide (Bumetanide 1 Mg Tab)  1 mg PO ASDIRECTED [facility]


   Last Admin: 09/01/21 08:53 Dose:  1 mg


   Documented by: 


Amiodarone HCl 450 mg/ (Dextrose/Water)  259 mls @ 33.3 mls/hr IV ASDIRECTED [facility]


   Last Infusion: 08/31/21 19:00 Dose:  Infused


   Documented by: 


Amiodarone HCl 150 mg/ (Dextrose/Water)  53 mls @ 200 mls/hr IV ASDIRECTED [facility]


   Stop: 08/31/21 12:00


Amiodarone HCl 900 mg/ (Dextrose/Water)  518 mls @ 16.67 mls/hr IV ASDIRECTED 

[facility] hospital


   Last Admin: 08/31/21 19:00 Dose:  16.67 mls/hr


   Documented by: 


Sodium Chloride (Normal Saline)  1,000 mls @ 100 mls/hr IV Q10H [facility]


   Last Admin: 09/01/21 15:20 Dose:  Not Given


   Documented by: 


Ketorolac Tromethamine (Ketorolac 30 Mg/Ml Sdv) Confirm Administered Dose 30 mg 

.ROUTE .STK-MED ONE


   Stop: 08/31/21 14:55


   Last Admin: 08/31/21 16:15 Dose:  Not Given


   Documented by: 


Ketorolac Tromethamine (Ketorolac 60 Mg/2 Ml Sdv)  30 mg IVPUSH ONETIME ONE


   Stop: 08/31/21 16:17


   Last Admin: 08/31/21 17:52 Dose:  30 mg


   Documented by: 


Magnesium Hydroxide (Magnesium Hydroxide 400 Mg/5 Ml Susp 30 Ml Cup) Confirm 

Administered Dose 30 ml .ROUTE .STK-MED ONE


   Stop: 09/01/21 18:14


   Last Admin: 09/01/21 18:21 Dose:  Not Given


   Documented by: 


Morphine Sulfate (Morphine Oral Concentrate 20 Mg/Ml 30 Ml Bottle)  5 - 10 mg PO

Q4H [facility]


   Last Admin: 09/01/21 13:07 Dose:  5 mg


   Documented by: 


Non-Formulary Medication (Budesonide/Formoterol [Symbicort 160-4.5 Mcg])  1 puff

INH BID [facility]


   Last Admin: 09/02/21 12:10 Dose:  Not Given


   Documented by: 


Non-Formulary Medication (Potassium Chloride [Klor-Con])  40 meq PO BID [facility]


   Last Admin: 09/01/21 11:11 Dose:  Not Given


   Documented by: 


Potassium Chloride (Potassium Chloride 20 Meq Tab.Er) Confirm Administered Dose 

40 meq .ROUTE .STK-MED ONE


   Stop: 09/01/21 08:51


   Last Admin: 09/01/21 11:09 Dose:  Not Given


   Documented by: 


Potassium Chloride (Potassium Chloride 20 Meq Tab.Er) Confirm Administered Dose 

20 meq .ROUTE .STK-MED ONE


   Stop: 09/01/21 08:53


   Last Admin: 09/01/21 11:10 Dose:  Not Given


   Documented by: 


Sodium Biphosphate/Sodium Phosphate (Sodium Phosphate,Monobasic/Sodium 

Phosphate,Dibasic Enema 133 Ml Bottle)  133 ml RECTAL ONETIME ONE


   Stop: 09/02/21 12:34


   Last Admin: 09/02/21 12:40 Dose:  133 ml


   Documented by: 











- Exam


Quality Assessment: Supplemental Oxygen, Central Line/PICC


General: Alert, Oriented, Cooperative, No Acute Distress


HEENT: Pupils Equal, Pupils Reactive, Mucous Membr. Moist/Pink


Neck: Supple, Trachea Midline


Lungs: Clear to Auscultation, Normal Respiratory Effort


Cardiovascular: Regular Rate, Regular Rhythm


Extremities: Normal Inspection, No Pedal Edema


Skin: Warm, Dry


Neurological: No New Focal Deficit


Psy/Mental Status: Alert, Normal Affect





- Patient Data


Result Diagrams: 


                                 08/31/21 09:28





                                 09/01/21 07:20





Sepsis Event Note





- Evaluation


Sepsis Screening Result: No Definite Risk





- Focused Exam


Vital Signs: 


                                   Vital Signs











  Temp Pulse Pulse Resp BP BP Pulse Ox


 


 09/02/21 17:00    86  28 H   124/75  93 L


 


 09/02/21 15:00        100


 


 09/02/21 12:29  36.6 C   82  18   109/70  96


 


 09/02/21 08:29  37.0 C   66  16   129/68  95


 


 09/02/21 08:25   66    129/68  














- Problem List & Annotations


(1) Ventricular tachyarrhythmia


SNOMED Code(s): 93338049, 62037174


   Code(s): I47.2 - VENTRICULAR TACHYCARDIA   Status: Acute   Priority: High   

Current Visit: Yes   





- Problem List Review


Problem List Initiated/Reviewed/Updated: Yes





- My Orders


Last 24 Hours: 


My Active Orders





09/01/21 17:08


Morphine [Morphine 20 MG/ML Soln]   5 - 10 mg PO Q4H PRN 





09/01/21 17:12


Bumetanide [Bumex]   1 mg PO DAILY PRN 





09/01/21 17:27


Alum Hydrox/Mag Hydrox/Simeth [Mag-Al Plus]   30 ml PO Q4H PRN 





09/01/21 17:57


Magnesium Hydroxide [Milk of Magnesia]   30 ml PO DAILY PRN 





09/01/21 18:15


Sodium Chloride 0.9% [Normal Saline] 1,000 ml IV ASDIRECTED 





09/01/21 20:00


Potassium Chloride [Klor-Con M20]   20 meq PO BID 





09/02/21 09:31


bisacodyL [Dulcolax]   10 mg RECTAL DAILY PRN 














- Assessment


Assessment:: 





Ventricular tachycardia, improving


Weakness





- Plan


Plan:: 





This patient continues to respond well to medication in a conservative 

management of his ventricular tachycardia.  His appetite is not great however he

 does have pancreatic cancer as well and has had a waning appetite over the past

 recent past.  He should remain on a cardiac monitor until he has fully 

transitioned to oral management of his tachycardia.  Critical care status can be

 DC'd this evening if he continues to remain in a normal sinus rhythm.

## 2021-09-02 NOTE — PCM.PN
- General Info


Date of Service: 09/02/21


Admission Dx/Problem (Free Text): 





Ventricular tachycardia


Subjective Update: 





This patient did well throughout the night and has continued to remain stable 

having been weaned from IV amiodarone to oral medication.  He has remained in 

normal sinus rhythm with a normal rate.  He has not had a fever, his appetite is

approximately same and he is drinking fluids.  He is transferring fairly well 

from bed to chair and back with the assistance of one.  He denies any pain 

including chest pain, headache and he has had no nausea or vomiting.


Functional Status: Reports: Pain Controlled





- Review of Systems


General: Reports: No Symptoms


HEENT: Reports: No Symptoms


Pulmonary: Reports: No Symptoms


Cardiovascular: Reports: No Symptoms


Gastrointestinal: Reports: Abdominal Pain.  Denies: Diarrhea, Nausea, Vomiting


Musculoskeletal: Reports: No Symptoms


Skin: Reports: No Symptoms


Neurological: Reports: No Symptoms





- Patient Data


Vitals - Most Recent: 


                                Last Vital Signs











Temp  36.6 C   09/02/21 12:29


 


Pulse  86   09/02/21 17:00


 


Resp  28 H  09/02/21 17:00


 


BP  124/75   09/02/21 17:00


 


Pulse Ox  93 L  09/02/21 17:00











Weight - Most Recent: 63.503 kg


I&O - Last 24 Hours: 


                                 Intake & Output











 09/02/21 09/02/21 09/02/21





 06:59 14:59 22:59


 


Intake Total 600  


 


Balance 600  











Med Orders - Current: 


                               Current Medications





Acetaminophen (Acetaminophen 325 Mg Tab)  650 mg PO Q4H PRN


   PRN Reason: Pain (Mild 1-3)/fever


Acetaminophen (Acetaminophen 650 Mg Tab.Er)  650 mg PO Q4H PRN


   PRN Reason: Pain (severe 7-10)


Al Hydroxide/Mg Hydroxide (Aluminum Hydroxide/Magnesium Hydroxide/Simethicone 

Susp 30 Ml Cup)  30 ml PO Q4H PRN


   PRN Reason: Indigestion


   Last Admin: 09/01/21 17:35 Dose:  30 ml


   Documented by: 


Amiodarone HCl (Amiodarone 200 Mg Tab)  400 mg PO BID [facility]


   Last Admin: 09/02/21 08:25 Dose:  400 mg


   Documented by: 


Apixaban (Apixaban 5 Mg Tab)  5 mg PO BID [facility]


   Last Admin: 09/02/21 08:25 Dose:  5 mg


   Documented by: 


Aspirin (Aspirin 81 Mg Tab.Chew)  81 mg PO DAILY [facility]


   Last Admin: 09/02/21 08:25 Dose:  81 mg


   Documented by: 


Atorvastatin Calcium (Atorvastatin 40 Mg Tab)  40 mg PO BEDTIME [facility]


   Last Admin: 09/01/21 20:22 Dose:  40 mg


   Documented by: 


Bisacodyl (Bisacodyl 10 Mg Supp)  10 mg RECTAL DAILY PRN


   PRN Reason: Constipation


   Last Admin: 09/02/21 09:40 Dose:  10 mg


   Documented by: 


Bumetanide (Bumetanide 1 Mg Tab)  1 mg PO DAILY PRN


   PRN Reason: Edema


Docusate Sodium (Docusate Sodium 100 Mg Cap)  100 mg PO DAILY [facility]


   Last Admin: 09/02/21 08:25 Dose:  100 mg


   Documented by: 


Famotidine (Famotidine 20 Mg Tab)  20 mg PO BID [facility]


   Last Admin: 09/02/21 08:24 Dose:  20 mg


   Documented by: 


Hydroxyzine HCl (Hydroxyzine Hcl 25 Mg Tab)  25 mg PO QID [facility]


   Last Admin: 09/02/21 12:09 Dose:  Not Given


   Documented by: 


Sodium Chloride (Normal Saline)  1,000 mls @ 50 mls/hr IV ASDIRECTED [facility]


   Last Admin: 09/01/21 18:15 Dose:  50 mls/hr


   Documented by: 


Levothyroxine Sodium (Levothyroxine 100 Mcg Tab)  100 mcg PO ACBREAKFAST [facility]


   Last Admin: 09/02/21 06:31 Dose:  100 mcg


   Documented by: 


Magnesium Hydroxide (Magnesium Hydroxide 400 Mg/5 Ml Susp 30 Ml Cup)  30 ml PO 

DAILY PRN


   PRN Reason: Constipation


   Last Admin: 09/01/21 18:15 Dose:  30 ml


   Documented by: 


Metoprolol Tartrate (Metoprolol Tartrate 25 Mg Tab)  25 mg PO BID [facility]


   Last Admin: 09/02/21 08:25 Dose:  25 mg


   Documented by: 


Mometasone Furoate/Formoterol Fumar (Formoterol/Mometasone 200-5 Mcg 8.8 Gm 

Inhaler)  1 puff IH BID [facility]


Morphine Sulfate (Morphine Oral Concentrate 20 Mg/Ml 30 Ml Bottle)  5 - 10 mg PO

Q4H PRN


   PRN Reason: Pain


Nitroglycerin (Nitroglycerin 0.4 Mg Tab.Sl)  0.4 mg SL Q5M PRN


   PRN Reason: Chest Pain


Non-Formulary Medication (Timolol Maleate [Timoptic 0.25% Ophth Soln])  1 drop 

EYELF BEDTIME [facility]


   Last Admin: 09/01/21 20:34 Dose:  Not Given


   Documented by: 


Ondansetron HCl (Ondansetron 4 Mg Tab.Dis)  4 mg PO Q4HR PRN


   PRN Reason: Nausea


Potassium Chloride (Potassium Chloride 20 Meq Tab.Er)  20 meq PO BID [facility]


   Last Admin: 09/02/21 08:24 Dose:  20 meq


   Documented by: 


Tamsulosin HCl (Tamsulosin 0.4 Mg Cap.Er)  0.4 mg PO BEDTIME [facility]


   Last Admin: 09/01/21 20:21 Dose:  0.4 mg


   Documented by: 





Discontinued Medications





Al Hydroxide/Mg Hydroxide (Aluminum Hydroxide/Magnesium Hydroxide/Simethicone 

Susp 30 Ml Cup) Confirm Administered Dose 30 ml .ROUTE .STK-MED ONE


   Stop: 09/01/21 17:31


   Last Admin: 09/01/21 17:55 Dose:  Not Given


   Documented by: 


Bumetanide (Bumetanide 1 Mg Tab)  1 mg PO ASDIRECTED [facility]


   Last Admin: 09/01/21 08:53 Dose:  1 mg


   Documented by: 


Amiodarone HCl 450 mg/ (Dextrose/Water)  259 mls @ 33.3 mls/hr IV ASDIRECTED [facility]


   Last Infusion: 08/31/21 19:00 Dose:  Infused


   Documented by: 


Amiodarone HCl 150 mg/ (Dextrose/Water)  53 mls @ 200 mls/hr IV ASDIRECTED [facility]


   Stop: 08/31/21 12:00


Amiodarone HCl 900 mg/ (Dextrose/Water)  518 mls @ 16.67 mls/hr IV ASDIRECTED 

[facility]


   Last Admin: 08/31/21 19:00 Dose:  16.67 mls/hr


   Documented by: 


Sodium Chloride (Normal Saline)  1,000 mls @ 100 mls/hr IV Q10H [facility]


   Last Admin: 09/01/21 15:20 Dose:  Not Given


   Documented by: 


Ketorolac Tromethamine (Ketorolac 30 Mg/Ml Sdv) Confirm Administered Dose 30 mg 

.ROUTE .STK-MED ONE


   Stop: 08/31/21 14:55


   Last Admin: 08/31/21 16:15 Dose:  Not Given


   Documented by: 


Ketorolac Tromethamine (Ketorolac 60 Mg/2 Ml Sdv)  30 mg IVPUSH ONETIME ONE


   Stop: 08/31/21 16:17


   Last Admin: 08/31/21 17:52 Dose:  30 mg


   Documented by: 


Magnesium Hydroxide (Magnesium Hydroxide 400 Mg/5 Ml Susp 30 Ml Cup) Confirm 

Administered Dose 30 ml .ROUTE .STK-MED ONE


   Stop: 09/01/21 18:14


   Last Admin: 09/01/21 18:21 Dose:  Not Given


   Documented by: 


Morphine Sulfate (Morphine Oral Concentrate 20 Mg/Ml 30 Ml Bottle)  5 - 10 mg PO

Q4H [facility]


   Last Admin: 09/01/21 13:07 Dose:  5 mg


   Documented by: 


Non-Formulary Medication (Budesonide/Formoterol [Symbicort 160-4.5 Mcg])  1 puff

INH BID [facility]


   Last Admin: 09/02/21 12:10 Dose:  Not Given


   Documented by: 


Non-Formulary Medication (Potassium Chloride [Klor-Con])  40 meq PO BID [facility]


   Last Admin: 09/01/21 11:11 Dose:  Not Given


   Documented by: 


Potassium Chloride (Potassium Chloride 20 Meq Tab.Er) Confirm Administered Dose 

40 meq .ROUTE .STK-MED ONE


   Stop: 09/01/21 08:51


   Last Admin: 09/01/21 11:09 Dose:  Not Given


   Documented by: 


Potassium Chloride (Potassium Chloride 20 Meq Tab.Er) Confirm Administered Dose 

20 meq .ROUTE .STK-MED ONE


   Stop: 09/01/21 08:53


   Last Admin: 09/01/21 11:10 Dose:  Not Given


   Documented by: 


Sodium Biphosphate/Sodium Phosphate (Sodium Phosphate,Monobasic/Sodium 

Phosphate,Dibasic Enema 133 Ml Bottle)  133 ml RECTAL ONETIME ONE


   Stop: 09/02/21 12:34


   Last Admin: 09/02/21 12:40 Dose:  133 ml


   Documented by: 











- Exam


Quality Assessment: Central Line/PICC.  No: Supplemental Oxygen


General: Alert, Oriented


HEENT: Pupils Equal, Pupils Reactive, EOMI, Mucous Membr. Moist/Pink


Neck: Supple


Lungs: Clear to Auscultation, Normal Respiratory Effort


Cardiovascular: Regular Rate, Regular Rhythm


Extremities: Normal Inspection


Skin: Warm, Dry


Neurological: No New Focal Deficit


Psy/Mental Status: Alert, Normal Affect





- Patient Data


Result Diagrams: 


                                 08/31/21 09:28





                                 09/01/21 07:20





Sepsis Event Note





- Evaluation


Sepsis Screening Result: No Definite Risk





- Focused Exam


Vital Signs: 


                                   Vital Signs











  Temp Pulse Pulse Resp BP BP Pulse Ox


 


 09/02/21 17:00    86  28 H   124/75  93 L


 


 09/02/21 15:00        100


 


 09/02/21 12:29  36.6 C   82  18   109/70  96


 


 09/02/21 08:29  37.0 C   66  16   129/68  95


 


 09/02/21 08:25   66    129/68  














- Problem List & Annotations


(1) Ventricular tachyarrhythmia


SNOMED Code(s): 98278984, 73895527


   Code(s): I47.2 - VENTRICULAR TACHYCARDIA   Status: Acute   Priority: High   

Current Visit: Yes   





- Problem List Review


Problem List Initiated/Reviewed/Updated: Yes





- My Orders


Last 24 Hours: 


My Active Orders





09/01/21 17:08


Morphine [Morphine 20 MG/ML Soln]   5 - 10 mg PO Q4H PRN 





09/01/21 17:12


Bumetanide [Bumex]   1 mg PO DAILY PRN 





09/01/21 17:27


Alum Hydrox/Mag Hydrox/Simeth [Mag-Al Plus]   30 ml PO Q4H PRN 





09/01/21 17:57


Magnesium Hydroxide [Milk of Magnesia]   30 ml PO DAILY PRN 





09/01/21 18:15


Sodium Chloride 0.9% [Normal Saline] 1,000 ml IV ASDIRECTED 





09/01/21 20:00


Potassium Chloride [Klor-Con M20]   20 meq PO BID 





09/02/21 09:31


bisacodyL [Dulcolax]   10 mg RECTAL DAILY PRN 














- Assessment


Assessment:: 





Ventricular tachycardia, improving


Weakness





- Plan


Plan:: 





This patient continues to do well on oral medication, remaining in normal sinus 

rhythm with a normal rate.  If he does well through the night he will be dischar

ge home tomorrow on oral medication.  Remaining care per orders.

## 2021-09-03 NOTE — PCM.DCSUM1
**Discharge Summary





- Hospital Course


Free Text/Narrative:: 





This patient was admitted to the inpatient unit for management of ventricular 

tachycardia.  He was initially treated with an amiodarone drip and was able to 

wean to oral medication.  He has remained in a normal sinus rhythm since he 

originally converted and has tolerated the oral medication very well.  He is 

quite comfortable, denying pain in his chest.  He does have a history of 

pancreatic cancer and does have some abdominal pain intermittently.  He is c

omplaining of that today.  His vital signs have been stable and he is afebrile. 

His appetite is poor but he is drinking some fluids when encouraged.  He does 

have some generalized weakness which was not related to this hospitalization.  

He is able to transfer with assistance.


Brief History: As noted above


Diagnosis: Stroke: No





- Discharge Data


Discharge Date: 09/03/21


Discharge Disposition: Home, Self-Care 01


Condition: Good





- Referral to Home Health


Primary Care Physician: 


PCP None








- Discharge Diagnosis/Problem(s)


(1) Ventricular tachyarrhythmia


SNOMED Code(s): 38388280, [id number]


   ICD Code: I47.2 - VENTRICULAR TACHYCARDIA   Status: Acute   Priority: High   

Current Visit: Yes   





- Discharge Plan


Prescriptions/Med Rec: 


Amiodarone HCl 400 mg PO BID #60 tablet


Home Medications: 


                                    Home Meds





Apixaban [Eliquis] 5 mg PO BID 09/09/18 [History]


atorvaSTATin [Lipitor] 40 mg PO BEDTIME 11/02/18 [History]


Acetaminophen [Tylenol Arthritis Pain] 650 mg PO Q4H PRN  tab.er 11/12/18 [Rx]


Levothyroxine Sodium [Synthroid] 100 mcg PO ACBREAKFAST 30 Days #30 tab 11/12/18

 [Rx]


Nitroglycerin [Nitrostat] 0.4 mg SL Q5M PRN  tab.sl 11/12/18 [Rx]


Tamsulosin [Flomax] 0.4 mg PO BEDTIME 30 Days #30 cap.er 11/12/18 [Rx]


Famotidine [Pepcid] 20 mg PO BID 02/11/19 [History]


Aspirin 81 mg PO DAILY 07/24/21 [History]


Bumetanide [Bumex] 1 mg PO ASDIRECTED 07/24/21 [History]


Metoprolol Tartrate 25 mg PO BID 07/24/21 [History]


timoloL maleate [Timoptic 0.25% Ophth Soln] 1 drop EYELF BEDTIME 07/24/21 

[History]


Budesonide/Formoterol [Symbicort 160-4.5 MCG] 1 puff INH BID 08/31/21 [History]


Morphine [Morphine 10 MG/5 ML] 2.5 ml PO Q4HR PRN 08/31/21 [History]


Ondansetron [Zofran ODT] 4 mg PO Q4HR PRN 08/31/21 [History]


hydrOXYzine HCL [Atarax] 25 mg PO QID 08/31/21 [History]


Amiodarone HCl 400 mg PO BID #60 tablet 09/03/21 [Rx]








Patient Handouts:  Ventricular Tachycardia


Forms:  ED Department Discharge


Referrals: 


PCP,None [Primary Care Provider] - 





- Discharge Summary/Plan Comment


DC Time >30 min.: No


Total # of Minutes for Discharge Time: 





30


Discharge Summary/Plan Comment: 





Central line was removed without difficulty or complication prior to discharge.





- General Info


Date of Service: 09/03/21


Admission Dx/Problem (Free Text: 





Ventricular tachycardia


Subjective Update: 





This patient remains in house for continued management of ventricular f

ibrillation.  He was converted with an amiodarone drip and has tolerated a 

switch to oral medication.  Since he originally converted back into normal sinus

rhythm he has remained in a normal sinus rhythm with a normal rate.  He has 

tolerated oral medication very well.  His vital signs of been stable and he has 

been afebrile.  He remains weak of; however, pancreatic cancer and is currently 

being treated for that.  His weakness is related to that diagnoses.  He has been

transferring fairly well with the assistance of nursing. This was not related to

this hospitalization. He denies pain with the exception of some intermittent, 

mild abdominal pain.  His appetite is poor for solids but he is raking fluids 

with encouragement.  


Functional Status: Reports: Pain Controlled





- Review of Systems


General: Reports: No Symptoms


HEENT: Reports: No Symptoms


Pulmonary: Reports: No Symptoms


Cardiovascular: Reports: No Symptoms


Gastrointestinal: Reports: Abdominal Pain


Musculoskeletal: Reports: No Symptoms


Skin: Reports: No Symptoms


Neurological: Reports: No Symptoms





- Patient Data


Vitals - Most Recent: 


                                Last Vital Signs











Temp  35.9 C L  09/03/21 07:35


 


Pulse  80   09/03/21 08:24


 


Resp  16   09/03/21 07:35


 


BP  126/76   09/03/21 08:24


 


Pulse Ox  96   09/03/21 05:00











Weight - Most Recent: 63.503 kg


I&O - Last 24 hours: 


                                 Intake & Output











 09/02/21 09/03/21 09/03/21





 22:59 06:59 14:59


 


Intake Total 634 600 


 


Balance 634 600 











Med Orders - Current: 


                               Current Medications





Acetaminophen (Acetaminophen 325 Mg Tab)  650 mg PO Q4H PRN


   PRN Reason: Pain (Mild 1-3)/fever


Acetaminophen (Acetaminophen 650 Mg Tab.Er)  650 mg PO Q4H PRN


   PRN Reason: Pain (severe 7-10)


   Last Admin: 09/02/21 19:49 Dose:  650 mg


   Documented by: 


Al Hydroxide/Mg Hydroxide (Aluminum Hydroxide/Magnesium Hydroxide/Simethicone 

Susp 30 Ml Cup)  30 ml PO Q4H PRN


   PRN Reason: Indigestion


   Last Admin: 09/01/21 17:35 Dose:  30 ml


   Documented by: 


Amiodarone HCl (Amiodarone 200 Mg Tab)  [facility]ID [facility]


   Last Admin: 09/03/21 08:22 Dose:  400 mg


   Documented by: 


Apixaban (Apixaban 5 Mg Tab)  5 mg PO BID [facility]


   Last Admin: 09/03/21 08:23 Dose:  5 mg


   Documented by: 


Aspirin (Aspirin 81 Mg Tab.Chew)  81 mg PO DAILY [facility]


   Last Admin: 09/03/21 08:24 Dose:  81 mg


   Documented by: 


Atorvastatin Calcium (Atorvastatin 40 Mg Tab)  40 mg PO BEDTIME [facility]


   Last Admin: 09/02/21 19:50 Dose:  40 mg


   Documented by: 


Bisacodyl (Bisacodyl 10 Mg Supp)  10 mg RECTAL DAILY PRN


   PRN Reason: Constipation


   Last Admin: 09/02/21 09:40 Dose:  10 mg


   Documented by: 


Bumetanide (Bumetanide 1 Mg Tab)  1 mg PO DAILY PRN


   PRN Reason: Edema


   Last Admin: 09/03/21 09:32 Dose:  1 mg


   Documented by: 


Docusate Sodium (Docusate Sodium 100 Mg Cap)  100 mg PO DAILY [facility]


   Last Admin: 09/03/21 08:24 Dose:  100 mg


   Documented by: 


Famotidine (Famotidine 20 Mg Tab)  20 mg PO BID [facility]


   Last Admin: 09/03/21 08:23 Dose:  20 mg


   Documented by: 


Hydroxyzine HCl (Hydroxyzine Hcl 25 Mg Tab)  25 mg PO QID [facility]


   Last Admin: 09/03/21 12:06 Dose:  25 mg


   Documented by: 


Sodium Chloride (Normal Saline)  1,000 mls @ 50 mls/hr IV ASDIRECTED [facility]


   Last Admin: 09/03/21 01:00 Dose:  50 mls/hr


   Documented by: 


Levothyroxine Sodium (Levothyroxine 100 Mcg Tab)  100 mcg PO ACBREAKFAST [facility]


   Last Admin: 09/03/21 06:16 Dose:  100 mcg


   Documented by: 


Magnesium Hydroxide (Magnesium Hydroxide 400 Mg/5 Ml Susp 30 Ml Cup)  30 ml PO 

DAILY PRN


   PRN Reason: Constipation


   Last Admin: 09/01/21 18:15 Dose:  30 ml


   Documented by: 


Metoprolol Tartrate (Metoprolol Tartrate 25 Mg Tab)  25 mg PO BID [facility]


   Last Admin: 09/03/21 08:24 Dose:  25 mg


   Documented by: 


Mometasone Furoate/Formoterol Fumar (Formoterol/Mometasone 200-5 Mcg 8.8 Gm 

Inhaler)  1 puff IH BID [facility]


   Last Admin: 09/03/21 08:25 Dose:  1 puff


   Documented by: 


Morphine Sulfate (Morphine Oral Concentrate 20 Mg/Ml 30 Ml Bottle)  5 - 10 mg PO

Q4H PRN


   PRN Reason: Pain


Nitroglycerin (Nitroglycerin 0.4 Mg Tab.Sl)  0.4 mg SL Q5M PRN


   PRN Reason: Chest Pain


Non-Formulary Medication (Timolol Maleate [Timoptic 0.25% Ophth Soln])  1 drop 

EYELF BEDTIME [facility]


   Last Admin: 09/02/21 19:56 Dose:  Not Given


   Documented by: 


Ondansetron HCl (Ondansetron 4 Mg Tab.Dis)  4 mg PO Q4HR PRN


   PRN Reason: Nausea


Potassium Chloride (Potassium Chloride 20 Meq Tab.Er)  20 meq PO BID [facility]


   Last Admin: 09/03/21 08:22 Dose:  20 meq


   Documented by: 


Tamsulosin HCl (Tamsulosin 0.4 Mg Cap.Er)  0.4 mg PO BEDTIME [facility]


   Last Admin: 09/02/21 19:50 Dose:  0.4 mg


   Documented by: 





Discontinued Medications





Al Hydroxide/Mg Hydroxide (Aluminum Hydroxide/Magnesium Hydroxide/Simethicone 

Susp 30 Ml Cup) Confirm Administered Dose 30 ml .ROUTE .STK-MED ONE


   Stop: 09/01/21 17:31


   Last Admin: 09/01/21 17:55 Dose:  Not Given


   Documented by: 


Bumetanide (Bumetanide 1 Mg Tab)  1 mg PO ASDIRECTED [facility]


   Last Admin: 09/01/21 08:53 Dose:  1 mg


   Documented by: 


Amiodarone HCl 450 mg/ (Dextrose/Water)  259 mls @ 33.3 mls/hr IV [facility]IRECTED [facility]


   Last Infusion: 08/31/21 19:00 Dose:  Infused


   Documented by: 


Amiodarone HCl 150 mg/ (Dextrose/Water)  53 mls @ 200 mls/hr IV [facility]IRECTED [facility]


   Stop: 08/31/21 12:00


Amiodarone HCl 900 mg/ (Dextrose/Water)  518 mls @ 16.67 mls/hr IV ASDIRECTED 

[facility]


   Last Admin: 08/31/21 19:00 Dose:  16.67 mls/hr


   Documented by: 


Sodium Chloride (Normal Saline)  1,000 mls @ 100 mls/hr IV Q10H [facility]


   Last Admin: 09/01/21 15:20 Dose:  Not Given


   Documented by: 


Ketorolac Tromethamine (Ketorolac 30 Mg/Ml Sdv) Confirm Administered Dose 30 mg 

.ROUTE .STK-MED ONE


   Stop: 08/31/21 14:55


   Last Admin: 08/31/21 16:15 Dose:  Not Given


   Documented by: 


Ketorolac Tromethamine (Ketorolac 60 Mg/2 Ml Sdv)  30 mg IVPUSH ONETIME ONE


   Stop: 08/31/21 16:17


   Last Admin: 08/31/21 17:52 Dose:  30 mg


   Documented by: 


Magnesium Hydroxide (Magnesium Hydroxide 400 Mg/5 Ml Susp 30 Ml Cup) Confirm 

Administered Dose 30 ml .ROUTE .STK-MED ONE


   Stop: 09/01/21 18:14


   Last Admin: 09/01/21 18:21 Dose:  Not Given


   Documented by: 


Morphine Sulfate (Morphine Oral Concentrate 20 Mg/Ml 30 Ml Bottle)  5 - 10 mg PO

Q4H [facility]


   Last Admin: 09/01/21 13:07 Dose:  5 mg


   Documented by: 


Non-Formulary Medication (Budesonide/Formoterol [Symbicort 160-4.5 Mcg])  1 puff

INH BID [facility]


   Last Admin: 09/02/21 12:10 Dose:  Not Given


   Documented by: 


Non-Formulary Medication (Potassium Chloride [Klor-Con])  40 meq PO BID [facility]


   Last Admin: 09/01/21 11:11 Dose:  Not Given


   Documented by: 


Potassium Chloride (Potassium Chloride 20 Meq Tab.Er) Confirm Administered Dose 

40 meq .ROUTE .STK-MED ONE


   Stop: 09/01/21 08:51


   Last Admin: 09/01/21 11:09 Dose:  Not Given


   Documented by: 


Potassium Chloride (Potassium Chloride 20 Meq Tab.Er) Confirm Administered Dose 

20 meq .ROUTE .STK-MED ONE


   Stop: 09/01/21 08:53


   Last Admin: 09/01/21 11:10 Dose:  Not Given


   Documented by: 


Sodium Biphosphate/Sodium Phosphate (Sodium Phosphate,Monobasic/Sodium 

Phosphate,Dibasic Enema 133 Ml Bottle)  133 ml RECTAL ONETIME ONE


   Stop: 09/02/21 12:34


   Last Admin: 09/02/21 12:40 Dose:  133 ml


   Documented by: 











- Exam


General: Reports: Alert, Oriented, Cooperative, No Acute Distress


HEENT: Reports: Pupils Equal (He tolerated that okay), Pupils Reactive, Mucous 

Membr. Moist/Pink


Neck: Reports: Supple


Lungs: Reports: Clear to Auscultation, Normal Respiratory Effort


Cardiovascular: Reports: Regular Rate, Regular Rhythm


GI/Abdominal Exam: Normal Bowel Sounds, Soft, No Distention, Other (Mild 

tenderness with palpation)


Skin: Reports: Warm, Dry


Wound/Incisions: Reports: Healing Well (Central line insertion site)


Neurological: Reports: No New Focal Deficit


Psy/Mental Status: Reports: Alert, Normal Affect, Normal Mood





Discharge Operative/Procedures





- Procedures Performed


Operations: Surgeon call me back I got a PE

## 2021-09-04 NOTE — PCM.SN.2
- Free Text/Narrative


Note: 





This patient was recently hospitalized for ventricular fibrillation.  He has 

multiple complex health problems including pancreatic cancer for which he will 

be starting therapy.  At the time he was discharged from our facility, he was in

a weakened and deconditioned state.  He is was discharged to home in the care of

his family who have some services available for him however require assistance 

with transfers and lifts.  It is my opinion that a [facility] lift is needed to 

complete transfers safely from bed to chair and commode.  Without the use of a 

lift this patient is likely to be bed confined.

## 2021-09-18 NOTE — EDM.PDOC
ED HPI GENERAL MEDICAL PROBLEM





- General


Chief Complaint: General


Stated Complaint: congestion


Time Seen by Provider: 09/18/21 11:45





- History of Present Illness


INITIAL COMMENTS - FREE TEXT/NARRATIVE: 





Pt has had some chest congestion and coughing symptoms.


His wife says he seems to be getting worse. He is not concerned, telling us he 

feels fine.


He has not been running a fever at home.





- Related Data


                                    Allergies











Allergy/AdvReac Type Severity Reaction Status Date / Time


 


No Known Allergies Allergy   Verified 09/18/21 12:01











Home Meds: 


                                    Home Meds





Apixaban [Eliquis] 5 mg PO BID 09/09/18 [History]


atorvaSTATin [Lipitor] 40 mg PO BEDTIME 11/02/18 [History]


Acetaminophen [Tylenol Arthritis Pain] 650 mg PO Q4H PRN  tab.er 11/12/18 [Rx]


Levothyroxine Sodium [Synthroid] 100 mcg PO ACBREAKFAST 30 Days #30 tab 11/12/18

 [Rx]


Nitroglycerin [Nitrostat] 0.4 mg SL Q5M PRN  tab.sl 11/12/18 [Rx]


Tamsulosin [Flomax] 0.4 mg PO BEDTIME 30 Days #30 cap.er 11/12/18 [Rx]


Famotidine [Pepcid] 20 mg PO BID 02/11/19 [History]


Aspirin 81 mg PO DAILY 07/24/21 [History]


Bumetanide [Bumex] 1 mg PO ASDIRECTED 07/24/21 [History]


Metoprolol Tartrate 25 mg PO BID 07/24/21 [History]


timoloL maleate [Timoptic 0.25% Ophth Soln] 1 drop EYELF BEDTIME 07/24/21 

[History]


Budesonide/Formoterol [Symbicort 160-4.5 MCG] 1 puff INH BID 08/31/21 [History]


Morphine [Morphine 10 MG/5 ML] 2.5 ml PO Q4HR PRN 08/31/21 [History]


Ondansetron [Zofran ODT] 4 mg PO Q4HR PRN 08/31/21 [History]


hydrOXYzine HCL [Atarax] 25 mg PO QID 08/31/21 [History]


Amiodarone HCl 400 mg PO BID #60 tablet 09/03/21 [Rx]











Past Medical History


HEENT History: Reports: Glaucoma, Impaired Vision


Cardiovascular History: Reports: Afib, Bypass, High Cholesterol, Hypertension, 

MI, Pacemaker


Other Cardiovascular History: cardiac arrest 10/18 pacemaker, 2 recent stents, 

automatic defibrillater


Respiratory History: Reports: COPD


Gastrointestinal History: Reports: GERD


Musculoskeletal History: Reports: Back Pain, Chronic


Neurological History: Reports: Vertigo


Other Neuro History: Occ. Headaches


Endocrine/Metabolic History: Reports: Hypoparathyroidism


Hematologic History: Reports: Anticoagulation Therapy


Oncologic (Cancer) History: Reports: None


Dermatologic History: Reports: Other (See Below)


Other Dermatologic History: scabbed rash on chest and left arm





- Infectious Disease History


Infectious Disease History: Reports: Chicken Pox, Shingles





- Past Surgical History


HEENT Surgical History: Reports: None


Cardiovascular Surgical History: Reports: Coronary Artery Bypass, Coronary 

Artery Stent, Pacer


Other Cardiovascular Surgeries/Procedures: 1993


GI Surgical History: Reports: None


Neurological Surgical History: Reports: Lumbar Spine, Thoracic Spine


Other Neurological Surgeries/Procedures: B feet:  decrease in sensation, no pain

 in LEs


Musculoskeletal Surgical History: Reports: Other (See Below)


Other Musculoskeletal Surgeries/Procedures:: laminectomy


Dermatological Surgical History: Reports: None





Social & Family History





- Family History


Family Medical History: No Pertinent Family History





- Tobacco Use


Tobacco Use Status *Q: Never Tobacco User


Second Hand Smoke Exposure: No





- Caffeine Use


Caffeine Use: Reports: Coffee





ED ROS GENERAL





- Review of Systems


Review Of Systems: Comprehensive ROS is negative, except as noted in HPI.


Respiratory: Reports: Cough, Other (and chest congestion.)





ED EXAM, GENERAL





- Physical Exam


Exam: See Below


Respiratory/Chest: Other (he has an occasional loose congested sounding cough.)


Extremities: Other (significant edema to both feet and ankles.)





Course





- Orders/Labs/Meds


Orders: 





                               Active Orders 24 hr











 Category Date Time Status


 


 Chest 1V Frontal [CR] Stat Exams  09/18/21 11:53 Taken











Labs: 





                                Laboratory Tests











  09/18/21 09/18/21 Range/Units





  12:40 12:40 


 


WBC  7.1  D   (4.0-11.0)  K/uL


 


RBC  3.35 L   (4.50-6.50)  M/uL


 


Hgb  10.7 L   (13.0-18.0)  g/dL


 


Hct  33.4 L   (40.0-54.0)  %


 


MCV  100 H   (76-96)  fL


 


MCH  31.9   (27.0-32.0)  pg


 


MCHC  32.0   (31.0-35.0)  g/dL


 


RDW  14.9   (11.0-16.0)  %


 


Plt Count  239   (150-400)  K/uL


 


MPV  11.1 H   (6.0-10.0)  fL


 


Neut % (Auto)  76.6 H   (45.0-70.0)  %


 


Lymph % (Auto)  9.6 L   (20.0-40.0)  %


 


Mono % (Auto)  11.8 H   (3.0-10.0)  %


 


Eos % (Auto)  1.7   (1.0-5.0)  %


 


Baso % (Auto)  0.3   (0.0-0.5)  %


 


Neut # (Auto)  5.45   (2.00-7.50)  K/uL


 


Lymph # (Auto)  0.68 L   (1.50-4.00)  K/uL


 


Mono # (Auto)  0.84 H   (0.20-0.80)  K/uL


 


Eos # (Auto)  0.12   (0.04-0.40)  K/uL


 


Baso # (Auto)  0.02   (0.02-0.10)  K/uL


 


Sodium   139  (136-145)  mmol/L


 


Potassium   4.5  (3.5-5.1)  mmol/L


 


Chloride   106  ()  mmol/L


 


Carbon Dioxide   26.2  (21.0-32.0)  mmol/L


 


Anion Gap   11.3  (5.0-15.0)  mmol/L


 


BUN   19  D  (8-26)  mg/dL


 


Creatinine   1.73 H D  (0.70-1.30)  mg/dL


 


Est Cr Clr Drug Dosing   TNP  


 


Estimated GFR (MDRD)   38 L  (>60)  MLS/MIN


 


BUN/Creatinine Ratio   11.0  (6-25)  


 


Glucose   118 H  ()  mg/dL


 


Calcium   8.1 L  (8.5-10.1)  mg/dL


 


Total Bilirubin   0.9  D  (0.0-1.0)  mg/dL


 


AST   32  (15-37)  U/L


 


ALT   24  (12-78)  U/L


 


Alkaline Phosphatase   65  ()  U/L


 


Total Protein   6.4  (6.4-8.2)  g/dL


 


Albumin   2.1 L  (3.4-5.0)  g/dL


 


Globulin   4.3 H  (2.2-4.2)  g/dL


 


Albumin/Globulin Ratio   0.5 L  (0.8-2.0)  














- Re-Assessments/Exams


Free Text/Narrative Re-Assessment/Exam: 





09/18/21 13:08


CXR does not show any significant congestion or Pneumonia.


Labs are also ok.


He will be discharged home. He has Bumex at home to use prs.


He is to take it for 3 days, longer if needed.


Monitor his condition closely.


Follow up as needed.





Departure





- Departure


Time of Disposition: 13:00


Disposition: Home, Self-Care 01


Condition: Fair


Clinical Impression: 


 CHF, Congestive heart failure








- Discharge Information


*PRESCRIPTION DRUG MONITORING PROGRAM REVIEWED*: Yes


*COPY OF PRESCRIPTION DRUG MONITORING REPORT IN PATIENT LILLIAM: Yes


Additional Instructions: 


Use Bumex for 3 days, longer if needed for chest congestion.


Activity as tolerated.


Follow up as needed.





Sepsis Event Note (ED)





- Evaluation


Sepsis Screening Result: No Definite Risk





- My Orders


Last 24 Hours: 





My Active Orders





09/18/21 11:53


Chest 1V Frontal [CR] Stat 














- Assessment/Plan


Last 24 Hours: 





My Active Orders





09/18/21 11:53


Chest 1V Frontal [CR] Stat

## 2021-09-19 NOTE — CR
Date of Service:  09/18/21

Clinical Data:  Chest congestion.



AP PORTABLE CHEST:  



Comparison is made to a prior exam dated 08/31/21.  



The heart remains enlarged, unchanged.  The cardiac pacer and pacer wires remain
unchanged in position.  The patient is status post median sternotomy.  There is 
calcification of the aortic arch.  



The lungs are clear.  No pneumothorax.  No pleural effusions.  



No evidence of acute intrathoracic disease.  



[id number]

[facility]

## 2021-09-21 NOTE — CR
Date of Service:  09/21/21

Clinical Data:  SOB.



AP CHEST:



Comparison is made to a prior exam dated 09/18/21. 



The patient has taken a poor inspiration.  The cardiac pacer and pacer wires 
remain unchanged in position.  The patient is status post median sternotomy.  
The heart is enlarged, unchanged.  There is calcification of the aortic arch.  



There is increased density in both lung bases consistent with basilar 
atelectasis or infiltrate.  Pneumonia should be considered.  There is slight 
blunting of both costophrenic angles suggesting small bilateral pleural 
effusions.  



No other significant findings.  No pneumothorax.  



[id number]

[facility]

## 2021-09-21 NOTE — ADMIT
HISTORY OF PRESENT ILLNESS:  This patient was admitted through the ER with 
pneumonia;

sepsis; and CHF, acute on chronic.  He was treated in the emergency room 
initially with

Zofran, normal saline bolus as well as Rocephin and Zithromax given IV.  He was 
also started

on Levophed because of extreme hypotension.  The patient came into the ER with 
an initial BP

read of 51/16.  His blood pressure came up to 90/60 with fluids, and when 
Levophed was

started and he was moved to the floor, his blood pressure was 133/60.  The 
patient's

condition improved during this timeframe of about 4 hours.  The patient refuses 
to be

transferred to an another facility, and I did consult with the hospitalist 
service, Dr. Chinchilla, who refused to accept the patient because of his critical status, 
stating

initially, he needed to have his code level changed or be transferred to another
facility.

I then had further discussion with the family and the patient and it was agreed 
he would be

switched to DNR/DNI.  I then consulted with Dr. Chinchilla a second time, who again 
refused to

accept the patient because he came in on 10 L of oxygen and we had him on a

pressor med / Levophed.  I had further discussion with the family who was 
adamant about him not

being transferred as well as the patient saying this.  By this time, he is more 
alert and is

able to talk in at least 2-3 word sentences initially.  He was drowsy, but was 
responsive to

verbal stimuli.  O2 had been weaned down to 6 L by the time he was taken to the 
floor and a

Villa catheter was placed.  I had a discussion with nursing staff here, and 
since his

condition had been slowly improving, we will admit him and consult with his 
primary care

provider, Dr. Bender in the morning, who will hopefully get involved with his care.

 

TREATMENT PLAN:  The patient initial labs reveal a WBC of 16,000, neutrophils 
90.  BUN 20,

creatinine 2.0.  Lactic acid 3.0.  BNP 21,828.  COVID test is negative chest x-
ray does

reveal pneumonia on the right mid and lower lobes.  It appears at least a 
possible pneumonia

by the time the patient was admitted to the floor.  His condition has definitely
improved.

 

 

CRS/MODL

DD:  09/21/2021 09:18:34

DT:  09/21/2021 12:45:02

Job #:  [id number]

LANIE

## 2021-09-21 NOTE — PCM.PN
- General Info


Date of Service: 09/21/21


Subjective Update: 





This is a 80yo M who arrived in the ER full code with decreasing oxygen 

saturation, tachycardia, low blood pressure at 50/16 and delirium. Patient has a

current history of pancreatic cancer. He was resuscitated and placed on 

pressors, stabilized and admitted to the ICU. He is currently alert and oriented

on Day 1 of ICU. In's 2.45L and Outs 2.8L. Patient on current levophed titration

to maintain his BP above 100/60. He remains slightly tachycardic. He was d

iagnosed with pneumonia and sepsis. He has been weaned to 3L Oxygen on NC. 


Functional Status: Reports: Pain Controlled





- Review of Systems


General: Reports: Weakness


HEENT: Reports: No Symptoms


Pulmonary: Reports: Shortness of Breath


Cardiovascular: Reports: Dyspnea on Exertion


Gastrointestinal: Reports: Decreased Appetite


Genitourinary: Reports: No Symptoms


Musculoskeletal: Reports: No Symptoms


Skin: Reports: Pallor


Neurological: Reports: Confusion, Weakness


Psychiatric: Reports: Confusion





- Patient Data


Vitals - Most Recent: 


                                Last Vital Signs











Temp  36.8 C   09/21/21 08:00


 


Pulse  95   09/21/21 08:00


 


Resp  16   09/21/21 08:00


 


BP  107/72   09/21/21 08:00


 


Pulse Ox  96   09/21/21 08:00











Weight - Most Recent: 63.503 kg


I&O - Last 24 Hours: 


                                 Intake & Output











 09/20/21 09/21/21 09/21/21





 22:59 06:59 14:59


 


Output Total  1800 600


 


Balance  -1800 -600











Lab Results Last 24 Hours: 


                         Laboratory Results - last 24 hr











  09/21/21 09/21/21 09/21/21 Range/Units





  02:20 02:20 02:20 


 


WBC  16.0 H D    (4.0-11.0)  K/uL


 


RBC  3.22 L    (4.50-6.50)  M/uL


 


Hgb  10.3 L    (13.0-18.0)  g/dL


 


Hct  31.7 L    (40.0-54.0)  %


 


MCV  98 H    (76-96)  fL


 


MCH  32.0    (27.0-32.0)  pg


 


MCHC  32.5    (31.0-35.0)  g/dL


 


RDW  14.8    (11.0-16.0)  %


 


Plt Count  262    (150-400)  K/uL


 


MPV  10.8 H    (6.0-10.0)  fL


 


Neut % (Auto)  90.5 H    (45.0-70.0)  %


 


Lymph % (Auto)  3.1 L    (20.0-40.0)  %


 


Mono % (Auto)  6.3    (3.0-10.0)  %


 


Eos % (Auto)  0.0 L    (1.0-5.0)  %


 


Baso % (Auto)  0.1    (0.0-0.5)  %


 


Neut # (Auto)  14.45 H    (2.00-7.50)  K/uL


 


Lymph # (Auto)  0.49 L    (1.50-4.00)  K/uL


 


Mono # (Auto)  1.00 H    (0.20-0.80)  K/uL


 


Eos # (Auto)  0.00 L    (0.04-0.40)  K/uL


 


Baso # (Auto)  0.01 L    (0.02-0.10)  K/uL


 


Sodium   137   (136-145)  mmol/L


 


Potassium   4.6   (3.5-5.1)  mmol/L


 


Chloride   105   ()  mmol/L


 


Carbon Dioxide   25.0   (21.0-32.0)  mmol/L


 


Anion Gap   11.6   (5.0-15.0)  mmol/L


 


BUN   20   (8-26)  mg/dL


 


Creatinine   2.00 H   (0.70-1.30)  mg/dL


 


Est Cr Clr Drug Dosing   TNP   


 


Estimated GFR (MDRD)   32 L   (>60)  MLS/MIN


 


BUN/Creatinine Ratio   10.0   (6-25)  


 


Glucose   103 H   ()  mg/dL


 


Lactic Acid     (0.4-2.0)  mmol/L


 


Calcium   7.9 L   (8.5-10.1)  mg/dL


 


Total Bilirubin   1.2 H D   (0.0-1.0)  mg/dL


 


AST   29   (15-37)  U/L


 


ALT   24   (12-78)  U/L


 


Alkaline Phosphatase   69   ()  U/L


 


Troponin I     (0.000-0.060)  ng/mL


 


B-Natriuretic Peptide    82992 H D  (0-450)  pg/mL


 


Total Protein   6.7   (6.4-8.2)  g/dL


 


Albumin   2.2 L   (3.4-5.0)  g/dL


 


Globulin   4.5 H   (2.2-4.2)  g/dL


 


Albumin/Globulin Ratio   0.5 L   (0.8-2.0)  


 


SARS-CoV-2 RNA (YOHANNES)     (NEGATIVE)  














  09/21/21 09/21/21 09/21/21 Range/Units





  02:20 02:20 02:41 


 


WBC     (4.0-11.0)  K/uL


 


RBC     (4.50-6.50)  M/uL


 


Hgb     (13.0-18.0)  g/dL


 


Hct     (40.0-54.0)  %


 


MCV     (76-96)  fL


 


MCH     (27.0-32.0)  pg


 


MCHC     (31.0-35.0)  g/dL


 


RDW     (11.0-16.0)  %


 


Plt Count     (150-400)  K/uL


 


MPV     (6.0-10.0)  fL


 


Neut % (Auto)     (45.0-70.0)  %


 


Lymph % (Auto)     (20.0-40.0)  %


 


Mono % (Auto)     (3.0-10.0)  %


 


Eos % (Auto)     (1.0-5.0)  %


 


Baso % (Auto)     (0.0-0.5)  %


 


Neut # (Auto)     (2.00-7.50)  K/uL


 


Lymph # (Auto)     (1.50-4.00)  K/uL


 


Mono # (Auto)     (0.20-0.80)  K/uL


 


Eos # (Auto)     (0.04-0.40)  K/uL


 


Baso # (Auto)     (0.02-0.10)  K/uL


 


Sodium     (136-145)  mmol/L


 


Potassium     (3.5-5.1)  mmol/L


 


Chloride     ()  mmol/L


 


Carbon Dioxide     (21.0-32.0)  mmol/L


 


Anion Gap     (5.0-15.0)  mmol/L


 


BUN     (8-26)  mg/dL


 


Creatinine     (0.70-1.30)  mg/dL


 


Est Cr Clr Drug Dosing     


 


Estimated GFR (MDRD)     (>60)  MLS/MIN


 


BUN/Creatinine Ratio     (6-25)  


 


Glucose     ()  mg/dL


 


Lactic Acid  3.0 H    (0.4-2.0)  mmol/L


 


Calcium     (8.5-10.1)  mg/dL


 


Total Bilirubin     (0.0-1.0)  mg/dL


 


AST     (15-37)  U/L


 


ALT     (12-78)  U/L


 


Alkaline Phosphatase     ()  U/L


 


Troponin I   0.025  D   (0.000-0.060)  ng/mL


 


B-Natriuretic Peptide     (0-450)  pg/mL


 


Total Protein     (6.4-8.2)  g/dL


 


Albumin     (3.4-5.0)  g/dL


 


Globulin     (2.2-4.2)  g/dL


 


Albumin/Globulin Ratio     (0.8-2.0)  


 


SARS-CoV-2 RNA (YOHANNES)    Negative  (NEGATIVE)  











Med Orders - Current: 


                               Current Medications





Acetaminophen (Acetaminophen 650 Mg Tab.Er)  650 mg PO Q4H PRN


   PRN Reason: Pain (severe 7-10)


Apixaban (Apixaban 5 Mg Tab)  5 mg PO BID [facility]


   Last Admin: 09/21/21 08:30 Dose:  5 mg


   Documented by: 


Aspirin (Aspirin 81 Mg Tab.Chew)  81 mg PO DAILY [facility]


   Last Admin: 09/21/21 08:31 Dose:  81 mg


   Documented by: 


Bumetanide (Bumetanide 1 Mg Tab)  1 mg PO ASDIRECTED [facility]


Famotidine (Famotidine 20 Mg Tab)  20 mg PO BID [facility]


   Last Admin: 09/21/21 08:31 Dose:  20 mg


   Documented by: 


Hydroxyzine HCl (Hydroxyzine Hcl 25 Mg Tab)  25 mg PO QID [facility]


   Last Admin: 09/21/21 13:26 Dose:  Not Given


   Documented by: 


Norepinephrine Bitartrate 4 mg (/ Dextrose/Water)  250 mls @ 7.5 mls/hr IV 

TITRATE HOWARD; Protocol


   Last Titration: 09/21/21 06:00 Dose:  2.5 mcg/min, 9.375 mls/hr


   Documented by: 


Promethazine HCl 6.25 mg/ (Sodium Chloride)  50.25 mls @ 200 mls/hr IV Q6H PRN


   PRN Reason: Nausea/Vomiting


Ceftriaxone Sodium 1 gm/ (Sodium Chloride)  50 mls @ 100 mls/hr IV Q24H [facility]


Azithromycin 500 mg/ Sodium (Chloride)  250 mls @ 250 mls/hr IV Q24H [facility]


Albumin Human (Flexbumin 25%)  200 mls @ 50 mls/hr IV DAILY [facility]


   Last Admin: 09/21/21 11:40 Dose:  50 mls/hr


   Documented by: 


Levothyroxine Sodium (Levothyroxine 100 Mcg Tab)  100 mcg PO ACBREAKFAST [facility]


   Last Admin: 09/21/21 08:30 Dose:  100 mcg


   Documented by: 


Metoprolol Tartrate (Metoprolol Tartrate 25 Mg Tab)  25 mg PO BID [facility]


   Last Admin: 09/21/21 08:31 Dose:  Not Given


   Documented by: 


Morphine Sulfate (Morphine 2 Mg/Ml Syringe)  2 mg IVPUSH Q1H PRN


   PRN Reason: Pain


   Last Admin: 09/21/21 04:35 Dose:  2 mg


   Documented by: 


Nitroglycerin (Nitroglycerin 0.4 Mg Tab.Sl)  0.4 mg SL Q5M PRN


   PRN Reason: Chest Pain


Non-Formulary Medication (Amiodarone Hcl [Amiodarone Hcl])  400 mg PO BID [facility]


   Last Admin: 09/21/21 08:45 Dose:  400 mg


   Documented by: 


Non-Formulary Medication (Budesonide/Formoterol [Symbicort 160-4.5 Mcg])  1 puff

INH BID [facility]


   Last Admin: 09/21/21 13:24 Dose:  Not Given


   Documented by: 


Non-Formulary Medication (Timolol Maleate [Timoptic 0.25% Ophth Soln])  1 drop 

EYELF BEDTIME [facility]


Ondansetron HCl (Ondansetron 4 Mg Tab.Dis)  4 mg PO Q4HR PRN


   PRN Reason: Nausea


Tamsulosin HCl (Tamsulosin 0.4 Mg Cap.Er)  0.4 mg PO BEDTIME [facility]





Discontinued Medications





Albuterol/Ipratropium (Albuterol/Ipratropium 3.0-0.5 Mg/3 Ml Neb Soln)  3 ml NEB

ONETIME ONE


   Stop: 09/21/21 01:34


   Last Admin: 09/21/21 01:34 Dose:  3 ml


   Documented by: 


Amiodarone HCl (Amiodarone 200 Mg Tab) Confirm Administered Dose 400 mg .ROUTE 

.STK-MED ONE


   Stop: 09/21/21 08:38


   Last Admin: 09/21/21 08:44 Dose:  Not Given


   Documented by: 


Ceftriaxone Sodium (Ceftriaxone 1 Gm Vial) Confirm Administered Dose 1 gm .ROUTE

.STK-MED ONE


   Stop: 09/21/21 02:52


   Last Admin: 09/21/21 06:07 Dose:  Not Given


   Documented by: 


Furosemide (Furosemide 40 Mg/4 Ml Vial)  40 mg IVPUSH NOW ONE


   Stop: 09/21/21 05:02


   Last Admin: 09/21/21 09:30 Dose:  40 mg


   Documented by: 


Furosemide (Furosemide 40 Mg/4 Ml Vial) Confirm Administered Dose 40 mg .ROUTE 

.STK-MED ONE


   Stop: 09/21/21 07:51


   Last Admin: 09/21/21 08:25 Dose:  Not Given


   Documented by: 


Sodium Chloride (Normal Saline)  1,000 mls @ 500 mls/hr IV .BOLUS ONE


   Stop: 09/21/21 03:51


   Last Admin: 09/21/21 05:30 Dose:  500 mls/hr


   Documented by: 


Ceftriaxone Sodium 1 gm/ (Sodium Chloride)  50 mls @ 100 mls/hr IV ONETIME ONE


   Stop: 09/21/21 02:57


   Last Admin: 09/21/21 02:46 Dose:  100 mls/hr


   Documented by: 


Azithromycin 500 mg/ Sodium (Chloride)  250 mls @ 250 mls/hr IV ONETIME ONE


   Stop: 09/21/21 03:27


   Last Admin: 09/21/21 03:25 Dose:  250 mls/hr


   Documented by: 


Albumin Human (Flexbumin 25%)  200 mls @ 100 mls/hr IV Q12HR ONE


   Stop: 09/21/21 11:01


Morphine Sulfate (Morphine 2 Mg/Ml Syringe) Confirm Administered Dose 2 mg 

.ROUTE .STK-MED ONE


   Stop: 09/21/21 04:35


   Last Admin: 09/21/21 06:08 Dose:  Not Given


   Documented by: 











- Exam


Quality Assessment: Supplemental Oxygen


Urinary Catheter Total Time: 0Days  2Hours


General: Alert, Cooperative


HEENT: Pupils Equal, Pupils Reactive, EOMI


Neck: Supple


Lungs: Decreased Breath Sounds, Crackles, Rales, Wheezing


Cardiovascular: Regular Rhythm, Tachycardia


GI/Abdominal Exam: Abnormal Bowel Sounds (decreased)


Extremities: Pedal Edema


Peripheral Pulses: 2+: Dorsalis Pedis (L), Dorsalis Pedis (R)


Skin: Dry, Intact, Cool


Psy/Mental Status: Alert, Normal Affect, Normal Mood





- Patient Data


Lab Results Last 24 hrs: 


                         Laboratory Results - last 24 hr











  09/21/21 09/21/21 09/21/21 Range/Units





  02:20 02:20 02:20 


 


WBC  16.0 H D    (4.0-11.0)  K/uL


 


RBC  3.22 L    (4.50-6.50)  M/uL


 


Hgb  10.3 L    (13.0-18.0)  g/dL


 


Hct  31.7 L    (40.0-54.0)  %


 


MCV  98 H    (76-96)  fL


 


MCH  32.0    (27.0-32.0)  pg


 


MCHC  32.5    (31.0-35.0)  g/dL


 


RDW  14.8    (11.0-16.0)  %


 


Plt Count  262    (150-400)  K/uL


 


MPV  10.8 H    (6.0-10.0)  fL


 


Neut % (Auto)  90.5 H    (45.0-70.0)  %


 


Lymph % (Auto)  3.1 L    (20.0-40.0)  %


 


Mono % (Auto)  6.3    (3.0-10.0)  %


 


Eos % (Auto)  0.0 L    (1.0-5.0)  %


 


Baso % (Auto)  0.1    (0.0-0.5)  %


 


Neut # (Auto)  14.45 H    (2.00-7.50)  K/uL


 


Lymph # (Auto)  0.49 L    (1.50-4.00)  K/uL


 


Mono # (Auto)  1.00 H    (0.20-0.80)  K/uL


 


Eos # (Auto)  0.00 L    (0.04-0.40)  K/uL


 


Baso # (Auto)  0.01 L    (0.02-0.10)  K/uL


 


Sodium   137   (136-145)  mmol/L


 


Potassium   4.6   (3.5-5.1)  mmol/L


 


Chloride   105   ()  mmol/L


 


Carbon Dioxide   25.0   (21.0-32.0)  mmol/L


 


Anion Gap   11.6   (5.0-15.0)  mmol/L


 


BUN   20   (8-26)  mg/dL


 


Creatinine   2.00 H   (0.70-1.30)  mg/dL


 


Est Cr Clr Drug Dosing   TNP   


 


Estimated GFR (MDRD)   32 L   (>60)  MLS/MIN


 


BUN/Creatinine Ratio   10.0   (6-25)  


 


Glucose   103 H   ()  mg/dL


 


Lactic Acid     (0.4-2.0)  mmol/L


 


Calcium   7.9 L   (8.5-10.1)  mg/dL


 


Total Bilirubin   1.2 H D   (0.0-1.0)  mg/dL


 


AST   29   (15-37)  U/L


 


ALT   24   (12-78)  U/L


 


Alkaline Phosphatase   69   ()  U/L


 


Troponin I     (0.000-0.060)  ng/mL


 


B-Natriuretic Peptide    90606 H D  (0-450)  pg/mL


 


Total Protein   6.7   (6.4-8.2)  g/dL


 


Albumin   2.2 L   (3.4-5.0)  g/dL


 


Globulin   4.5 H   (2.2-4.2)  g/dL


 


Albumin/Globulin Ratio   0.5 L   (0.8-2.0)  


 


SARS-CoV-2 RNA (YOHANNES)     (NEGATIVE)  














  09/21/21 09/21/21 09/21/21 Range/Units





  02:20 02:20 02:41 


 


WBC     (4.0-11.0)  K/uL


 


RBC     (4.50-6.50)  M/uL


 


Hgb     (13.0-18.0)  g/dL


 


Hct     (40.0-54.0)  %


 


MCV     (76-96)  fL


 


MCH     (27.0-32.0)  pg


 


MCHC     (31.0-35.0)  g/dL


 


RDW     (11.0-16.0)  %


 


Plt Count     (150-400)  K/uL


 


MPV     (6.0-10.0)  fL


 


Neut % (Auto)     (45.0-70.0)  %


 


Lymph % (Auto)     (20.0-40.0)  %


 


Mono % (Auto)     (3.0-10.0)  %


 


Eos % (Auto)     (1.0-5.0)  %


 


Baso % (Auto)     (0.0-0.5)  %


 


Neut # (Auto)     (2.00-7.50)  K/uL


 


Lymph # (Auto)     (1.50-4.00)  K/uL


 


Mono # (Auto)     (0.20-0.80)  K/uL


 


Eos # (Auto)     (0.04-0.40)  K/uL


 


Baso # (Auto)     (0.02-0.10)  K/uL


 


Sodium     (136-145)  mmol/L


 


Potassium     (3.5-5.1)  mmol/L


 


Chloride     ()  mmol/L


 


Carbon Dioxide     (21.0-32.0)  mmol/L


 


Anion Gap     (5.0-15.0)  mmol/L


 


BUN     (8-26)  mg/dL


 


Creatinine     (0.70-1.30)  mg/dL


 


Est Cr Clr Drug Dosing     


 


Estimated GFR (MDRD)     (>60)  MLS/MIN


 


BUN/Creatinine Ratio     (6-25)  


 


Glucose     ()  mg/dL


 


Lactic Acid  3.0 H    (0.4-2.0)  mmol/L


 


Calcium     (8.5-10.1)  mg/dL


 


Total Bilirubin     (0.0-1.0)  mg/dL


 


AST     (15-37)  U/L


 


ALT     (12-78)  U/L


 


Alkaline Phosphatase     ()  U/L


 


Troponin I   0.025  D   (0.000-0.060)  ng/mL


 


B-Natriuretic Peptide     (0-450)  pg/mL


 


Total Protein     (6.4-8.2)  g/dL


 


Albumin     (3.4-5.0)  g/dL


 


Globulin     (2.2-4.2)  g/dL


 


Albumin/Globulin Ratio     (0.8-2.0)  


 


SARS-CoV-2 RNA (YOHANNES)    Negative  (NEGATIVE)  











Result Diagrams: 


                                 09/21/21 14:00





                                 09/21/21 14:33





Sepsis Event Note





- Evaluation


Sepsis Screening Result: Possible Sepsis Risk





- Focused Exam


Vital Signs: 


                                   Vital Signs











  Temp Pulse Resp BP Pulse Ox


 


 09/21/21 08:00  36.8 C  95  16  107/72  96


 


 09/21/21 05:00   108 H  19  105/58 L  99


 


 09/21/21 04:30   105 H  19  113/60  98


 


 09/21/21 04:00   110 H  19  95/62  98


 


 09/21/21 03:30   113 H  20  76/47 L  100


 


 09/21/21 03:00   120 H  20  61/36 L  100


 


 09/21/21 02:30   130 H  24 H  62/44 L  95














- Problem List Review


Problem List Initiated/Reviewed/Updated: Yes





- My Orders


Last 24 Hours: 


My Active Orders





09/21/21 08:00


Albumin 25% [Flexbumin 25%] 200 ml IV DAILY 





09/21/21 14:00


CBC WITH AUTO DIFF [HEME] Routine 


cefTRIAXone [Rocephin] 1 gm   Sodium Chloride 0.9% [Normal Saline] 50 ml IV Q24H







09/21/21 15:00


Azithromycin [Zithromax] 500 mg   Sodium Chloride 0.9% [Normal Saline (AdvBag)] 

250 ml IV Q24H 





09/22/21 05:11


Chest 1V Frontal [CR] AM 


CBC WITH AUTO DIFF [HEME] AM 


COMPREHENSIVE METABOLIC PN,CMP [CHEM] AM 


MAGNESIUM [CHEM] Routine 














- Plan


Plan:: 





Patient in ICU Day 1 with [place] II score 23 with estimated mortality rate of 

40%.





Cardiogenic shock/Hypotension/CHF - Maintain pressors and titrate. Cardiac 

monitoring. BNP 49287 (prior BNP 2 months ago 5462). 


Pneumonia - Rocephin and Azithromycin daily dosing. F/u CBC today and in the AM 

for further management. 


Sepsis - [place] at [address] - f/u CBC. Fluid resuscitation - monitor 

pulmonary congestion at the same time. Continue IV antibiotics. 


Acute renal failure - Gentle hydration and repeat BUN/Cr. F/u labs daily and as 

needed. Renal diet. 


Anemia - Consider supplementation. F/u CBC and monitor any further drop for 

blood loss. 


COPD - On 3L nasal canula; continue current inhaler and nebulizer treatment. 


Metastatic Pancreatic Cancer - Palliative care patient. May choose to follow up 

with Oncology as directed. 


Hypothyroidism - Stable continue on current medication. 


DVT prophylaxis - SCD's.

## 2021-09-21 NOTE — EDM.PDOC
ED HPI GENERAL MEDICAL PROBLEM





- General


Chief Complaint: Respiratory Problem


Stated Complaint: WEAKNESS


Time Seen by Provider: 09/21/21 01:00





- History of Present Illness


INITIAL COMMENTS - FREE TEXT/NARRATIVE: 





Pt comes in by EMS with C/O SOB and Hypoxia.


His wife found him gasping in bed.


No falls or injuries. 02 sats improved to the mid 80's with O2 @ 10 liters per 

mask.





- Related Data


                                    Allergies











Allergy/AdvReac Type Severity Reaction Status Date / Time


 


No Known Allergies Allergy   Verified 09/18/21 12:01











Home Meds: 


                                    Home Meds





Apixaban [Eliquis] 5 mg PO BID 09/09/18 [History]


atorvaSTATin [Lipitor] 40 mg PO BEDTIME 11/02/18 [History]


Acetaminophen [Tylenol Arthritis Pain] 650 mg PO Q4H PRN  tab.er 11/12/18 [Rx]


Levothyroxine Sodium [Synthroid] 100 mcg PO ACBREAKFAST 30 Days #30 tab 11/12/18

 [Rx]


Nitroglycerin [Nitrostat] 0.4 mg SL Q5M PRN  tab.sl 11/12/18 [Rx]


Tamsulosin [Flomax] 0.4 mg PO BEDTIME 30 Days #30 cap.er 11/12/18 [Rx]


Famotidine [Pepcid] 20 mg PO BID 02/11/19 [History]


Aspirin 81 mg PO DAILY 07/24/21 [History]


Bumetanide [Bumex] 1 mg PO ASDIRECTED 07/24/21 [History]


Metoprolol Tartrate 25 mg PO BID 07/24/21 [History]


timoloL maleate [Timoptic 0.25% Ophth Soln] 1 drop EYELF BEDTIME 07/24/21 

[History]


Budesonide/Formoterol [Symbicort 160-4.5 MCG] 1 puff INH BID 08/31/21 [History]


Morphine [Morphine 10 MG/5 ML] 2.5 ml PO Q4HR PRN 08/31/21 [History]


Ondansetron [Zofran ODT] 4 mg PO Q4HR PRN 08/31/21 [History]


hydrOXYzine HCL [Atarax] 25 mg PO QID 08/31/21 [History]


Amiodarone HCl 400 mg PO BID #60 tablet 09/03/21 [Rx]











Past Medical History


HEENT History: Reports: Glaucoma, Impaired Vision


Cardiovascular History: Reports: Afib, Bypass, High Cholesterol, Hypertension, 

MI, Pacemaker


Other Cardiovascular History: cardiac arrest 10/18 pacemaker, 2 recent stents, 

automatic defibrillater


Respiratory History: Reports: COPD


Gastrointestinal History: Reports: GERD


Musculoskeletal History: Reports: Back Pain, Chronic


Neurological History: Reports: Vertigo


Other Neuro History: Occ. Headaches


Endocrine/Metabolic History: Reports: Hypoparathyroidism


Hematologic History: Reports: Anticoagulation Therapy


Oncologic (Cancer) History: Reports: None


Dermatologic History: Reports: Other (See Below)


Other Dermatologic History: scabbed rash on chest and left arm





- Infectious Disease History


Infectious Disease History: Reports: Chicken Pox, Shingles





- Past Surgical History


HEENT Surgical History: Reports: None


Cardiovascular Surgical History: Reports: Coronary Artery Bypass, Coronary 

Artery Stent, Pacer


Other Cardiovascular Surgeries/Procedures: 1993


GI Surgical History: Reports: None


Neurological Surgical History: Reports: Lumbar Spine, Thoracic Spine


Other Neurological Surgeries/Procedures: B feet:  decrease in sensation, no pain

 in LEs


Musculoskeletal Surgical History: Reports: Other (See Below)


Other Musculoskeletal Surgeries/Procedures:: laminectomy


Dermatological Surgical History: Reports: None





Social & Family History





- Family History


Family Medical History: No Pertinent Family History





- Caffeine Use


Caffeine Use: Reports: Coffee





ED ROS GENERAL





- Review of Systems


Review Of Systems: Comprehensive ROS is negative, except as noted in HPI.


Constitutional: Reports: Weakness


Respiratory: Reports: Shortness of Breath, Wheezing


Skin: Reports: Dryness


Neurological: Reports: Trouble Speaking (due to weakness.), Other





ED EXAM, GENERAL





- Physical Exam


Exam: See Below


Free Text/Narrative:: 





Pt is drowsy but does respond to verbal stimuli.


He denies pain, but states it's hard to breath.


V.S. initially are BP51/16 - P 142 - sats are 100% on 10 liters - Afebrile.


General Appearance: Lethargic, Mild Distress


Eye Exam: Bilateral Eye: PERRL


Throat/Mouth: Other (Mucous membranes are dry.)


Respiratory/Chest: Decreased Breath Sounds, Crackles (in all lobes.), Rhonchi


Cardiovascular: Other (pulse varies from 160 down to 140's. Rhythm is often 

paced.)





Course





- Vital Signs


Last Recorded V/S: 


                                Last Vital Signs











Temp  98.3 F   09/21/21 08:00


 


Pulse  95   09/21/21 08:00


 


Resp  16   09/21/21 08:00


 


BP  107/72   09/21/21 08:00


 


Pulse Ox  96   09/21/21 08:00














- Orders/Labs/Meds


Orders: 


                               Active Orders 24 hr











 Category Date Time Status


 


 EKG Documentation Completion [RC] ASDIRECTED Care  09/21/21 01:35 Active


 


 RT Aerosol Therapy [RC] ASDIRECTED Care  09/21/21 01:34 Active


 


 CULTURE BLOOD [BC] Stat Lab  09/21/21 02:20 Received


 


 UA W/MICROSCOPIC [URIN] Stat Lab  09/21/21 03:03 Ordered


 


 Morphine Med  09/21/21 03:50 Active





 2 mg IVPUSH Q1H PRN   


 


 Norepinephrine [Levophed] 4 mg Med  09/21/21 02:45 Active





 Dextrose 5% in Water 246 ml   





 IV TITRATE   


 


 EKG 12 Lead [EK] Stat Ther  09/21/21 01:35 Ordered








                                Medication Orders





Acetaminophen (Acetaminophen 650 Mg Tab.Er)  650 mg PO Q4H PRN


   PRN Reason: Pain (severe 7-10)


Apixaban (Apixaban 5 Mg Tab)  5 mg PO BID [facility]


   Last Admin: 09/21/21 08:30  Dose: 5 mg


   Documented by: LASHANDA


Aspirin (Aspirin 81 Mg Tab.Chew)  81 mg PO DAILY [facility]


   Last Admin: 09/21/21 08:31  Dose: 81 mg


   Documented by: LASHANDA


Bumetanide (Bumetanide 1 Mg Tab)  1 mg PO ASDIRECTED [facility]


Famotidine (Famotidine 20 Mg Tab)  20 mg PO BID [facility]


   Last Admin: 09/21/21 08:31  Dose: 20 mg


   Documented by: LASHANDA


Hydroxyzine HCl (Hydroxyzine Hcl 25 Mg Tab)  25 mg PO QID [facility]


   Last Admin: 09/21/21 08:31  Dose: 25 mg


   Documented by: LASHANDA


Norepinephrine Bitartrate 4 mg (/ Dextrose/Water)  250 mls @ 7.5 mls/hr IV 

TITRATE [facility]; Protocol


   Last Titration: 09/21/21 06:00  Dose: 2.5 mcg/min, 9.375 mls/hr


   Documented by: RUDY


   Admin: 09/21/21 04:10  Dose: 2 mcg/min, 7.5 mls/hr


   Documented by: HOPWSTE


Promethazine HCl 6.25 mg/ (Sodium Chloride)  50.25 mls @ 200 mls/hr IV Q6H PRN


   PRN Reason: Nausea/Vomiting


Levothyroxine Sodium (Levothyroxine 100 Mcg Tab)  100 mcg PO ACBREAKFAST [facility]


   Last Admin: 09/21/21 08:30  Dose: 100 mcg


   Documented by: LASHANDA


Metoprolol Tartrate (Metoprolol Tartrate 25 Mg Tab)  25 mg PO BID [facility]


   Last Admin: 09/21/21 08:31 Dose:  Not Given


   Documented by: LASHANDA


Morphine Sulfate (Morphine 2 Mg/Ml Syringe)  2 mg IVPUSH Q1H PRN


   PRN Reason: Pain


   Last Admin: 09/21/21 04:35  Dose: 2 mg


   Documented by: RUDY


Nitroglycerin (Nitroglycerin 0.4 Mg Tab.Sl)  0.4 mg SL Q5M PRN


   PRN Reason: Chest Pain


Non-Formulary Medication (Amiodarone Hcl [Amiodarone Hcl])  400 mg PO BID [facility]


   Last Admin: 09/21/21 08:45  Dose: 400 mg


   Documented by: LASHANDA


Non-Formulary Medication (Budesonide/Formoterol [Symbicort 160-4.5 Mcg])  1 puff

INH BID [facility]


Non-Formulary Medication (Timolol Maleate [Timoptic 0.25% Ophth Soln])  1 drop 

EYELF BEDTIME [facility]


Ondansetron HCl (Ondansetron 4 Mg Tab.Dis)  4 mg PO Q4HR PRN


   PRN Reason: Nausea


Tamsulosin HCl (Tamsulosin 0.4 Mg Cap.Er)  0.4 mg PO BEDTIME [facility]








Labs: 


                                Laboratory Tests











  09/21/21 09/21/21 09/21/21 Range/Units





  02:20 02:20 02:20 


 


WBC  16.0 H D    (4.0-11.0)  K/uL


 


RBC  3.22 L    (4.50-6.50)  M/uL


 


Hgb  10.3 L    (13.0-18.0)  g/dL


 


Hct  31.7 L    (40.0-54.0)  %


 


MCV  98 H    (76-96)  fL


 


MCH  32.0    (27.0-32.0)  pg


 


MCHC  32.5    (31.0-35.0)  g/dL


 


RDW  14.8    (11.0-16.0)  %


 


Plt Count  262    (150-400)  K/uL


 


MPV  10.8 H    (6.0-10.0)  fL


 


Neut % (Auto)  90.5 H    (45.0-70.0)  %


 


Lymph % (Auto)  3.1 L    (20.0-40.0)  %


 


Mono % (Auto)  6.3    (3.0-10.0)  %


 


Eos % (Auto)  0.0 L    (1.0-5.0)  %


 


Baso % (Auto)  0.1    (0.0-0.5)  %


 


Neut # (Auto)  14.45 H    (2.00-7.50)  K/uL


 


Lymph # (Auto)  0.49 L    (1.50-4.00)  K/uL


 


Mono # (Auto)  1.00 H    (0.20-0.80)  K/uL


 


Eos # (Auto)  0.00 L    (0.04-0.40)  K/uL


 


Baso # (Auto)  0.01 L    (0.02-0.10)  K/uL


 


Sodium   137   (136-145)  mmol/L


 


Potassium   4.6   (3.5-5.1)  mmol/L


 


Chloride   105   ()  mmol/L


 


Carbon Dioxide   25.0   (21.0-32.0)  mmol/L


 


Anion Gap   11.6   (5.0-15.0)  mmol/L


 


BUN   20   (8-26)  mg/dL


 


Creatinine   2.00 H   (0.70-1.30)  mg/dL


 


Est Cr Clr Drug Dosing   TNP   


 


Estimated GFR (MDRD)   32 L   (>60)  MLS/MIN


 


BUN/Creatinine Ratio   10.0   (6-25)  


 


Glucose   103 H   ()  mg/dL


 


Lactic Acid     (0.4-2.0)  mmol/L


 


Calcium   7.9 L   (8.5-10.1)  mg/dL


 


Total Bilirubin   1.2 H D   (0.0-1.0)  mg/dL


 


AST   29   (15-37)  U/L


 


ALT   24   (12-78)  U/L


 


Alkaline Phosphatase   69   ()  U/L


 


Troponin I     (0.000-0.060)  ng/mL


 


B-Natriuretic Peptide    29208 H D  (0-450)  pg/mL


 


Total Protein   6.7   (6.4-8.2)  g/dL


 


Albumin   2.2 L   (3.4-5.0)  g/dL


 


Globulin   4.5 H   (2.2-4.2)  g/dL


 


Albumin/Globulin Ratio   0.5 L   (0.8-2.0)  


 


SARS-CoV-2 RNA (YOHANNES)     (NEGATIVE)  














  09/21/21 09/21/21 09/21/21 Range/Units





  02:20 02:20 02:41 


 


WBC     (4.0-11.0)  K/uL


 


RBC     (4.50-6.50)  M/uL


 


Hgb     (13.0-18.0)  g/dL


 


Hct     (40.0-54.0)  %


 


MCV     (76-96)  fL


 


MCH     (27.0-32.0)  pg


 


MCHC     (31.0-35.0)  g/dL


 


RDW     (11.0-16.0)  %


 


Plt Count     (150-400)  K/uL


 


MPV     (6.0-10.0)  fL


 


Neut % (Auto)     (45.0-70.0)  %


 


Lymph % (Auto)     (20.0-40.0)  %


 


Mono % (Auto)     (3.0-10.0)  %


 


Eos % (Auto)     (1.0-5.0)  %


 


Baso % (Auto)     (0.0-0.5)  %


 


Neut # (Auto)     (2.00-7.50)  K/uL


 


Lymph # (Auto)     (1.50-4.00)  K/uL


 


Mono # (Auto)     (0.20-0.80)  K/uL


 


Eos # (Auto)     (0.04-0.40)  K/uL


 


Baso # (Auto)     (0.02-0.10)  K/uL


 


Sodium     (136-145)  mmol/L


 


Potassium     (3.5-5.1)  mmol/L


 


Chloride     ()  mmol/L


 


Carbon Dioxide     (21.0-32.0)  mmol/L


 


Anion Gap     (5.0-15.0)  mmol/L


 


BUN     (8-26)  mg/dL


 


Creatinine     (0.70-1.30)  mg/dL


 


Est Cr Clr Drug Dosing     


 


Estimated GFR (MDRD)     (>60)  MLS/MIN


 


BUN/Creatinine Ratio     (6-25)  


 


Glucose     ()  mg/dL


 


Lactic Acid  3.0 H    (0.4-2.0)  mmol/L


 


Calcium     (8.5-10.1)  mg/dL


 


Total Bilirubin     (0.0-1.0)  mg/dL


 


AST     (15-37)  U/L


 


ALT     (12-78)  U/L


 


Alkaline Phosphatase     ()  U/L


 


Troponin I   0.025  D   (0.000-0.060)  ng/mL


 


B-Natriuretic Peptide     (0-450)  pg/mL


 


Total Protein     (6.4-8.2)  g/dL


 


Albumin     (3.4-5.0)  g/dL


 


Globulin     (2.2-4.2)  g/dL


 


Albumin/Globulin Ratio     (0.8-2.0)  


 


SARS-CoV-2 RNA (YOHANNES)    Negative  (NEGATIVE)  











Meds: 


Medications











Generic Name Dose Route Start Last Admin





  Trade Name Freq  PRN Reason Stop Dose Admin


 


Acetaminophen  650 mg  09/21/21 03:55 





  Acetaminophen 650 Mg Tab.Er  PO  





  Q4H PRN  





  Pain (severe 7-10)  


 


Apixaban  5 mg  09/21/21 08:00  09/21/21 08:30





  Apixaban 5 Mg Tab  PO   5 mg





  BID [facility]   Administration


 


Aspirin  81 mg  09/21/21 08:00  09/21/21 08:31





  Aspirin 81 Mg Tab.Chew  PO   81 mg





  DAILY [facility]   Administration


 


Bumetanide  1 mg  09/21/21 04:00 





  Bumetanide 1 Mg Tab  PO  





  ASDIRECTED [facility]  


 


Famotidine  20 mg  09/21/21 08:00  09/21/21 08:31





  Famotidine 20 Mg Tab  PO   20 mg





  BID [facility]   Administration


 


Hydroxyzine HCl  25 mg  09/21/21 08:00  09/21/21 08:31





  Hydroxyzine Hcl 25 Mg Tab  PO   25 mg





  QID [facility]   Administration


 


Norepinephrine Bitartrate 4 mg  250 mls @ 7.5 mls/hr  09/21/21 02:45  09/21/21 

06:00





  / Dextrose/Water  IV   2.5 mcg/min





  TITRATE [facility]   9.375 mls/hr





    Titration





  Protocol  





  2 MCG/MIN  


 


Promethazine HCl 6.25 mg/  50.25 mls @ 200 mls/hr  09/21/21 03:59 





  Sodium Chloride  IV  





  Q6H PRN  





  Nausea/Vomiting  


 


Levothyroxine Sodium  100 mcg  09/21/21 07:00  09/21/21 08:30





  Levothyroxine 100 Mcg Tab  PO   100 mcg





  ACBREAKFAST [facility]   Administration


 


Metoprolol Tartrate  25 mg  09/21/21 08:00  09/21/21 08:31





  Metoprolol Tartrate 25 Mg Tab  PO   Not Given





  BID [facility]ARD  


 


Morphine Sulfate  2 mg  09/21/21 03:50  09/21/21 04:35





  Morphine 2 Mg/Ml Syringe  IVPUSH   2 mg





  Q1H PRN   Administration





  Pain  


 


Nitroglycerin  0.4 mg  09/21/21 03:55 





  Nitroglycerin 0.4 Mg Tab.Sl  SL  





  Q5M PRN  





  Chest Pain  


 


Non-Formulary Medication  400 mg  09/21/21 08:00  09/21/21 08:45





  Amiodarone Hcl [Amiodarone Hcl]  PO   400 mg





  BID HWOARD   Administration


 


Non-Formulary Medication  1 puff  09/21/21 08:00 





  Budesonide/Formoterol [Symbicort 160-4.5 Mcg]  INH  





  BID [facility]ARD  


 


Non-Formulary Medication  1 drop  09/21/21 20:00 





  Timolol Maleate [Timoptic 0.25% Ophth Soln]  EYELF  





  BEDTIME HOWARD  


 


Ondansetron HCl  4 mg  09/21/21 03:55 





  Ondansetron 4 Mg Tab.Dis  PO  





  Q4HR PRN  





  Nausea  


 


Tamsulosin HCl  0.4 mg  09/21/21 20:00 





  Tamsulosin 0.4 Mg Cap.Er  PO  





  BEDTIME [facility]ARD  














Discontinued Medications














Generic Name Dose Route Start Last Admin





  Trade Name Freq  PRN Reason Stop Dose Admin


 


Albuterol/Ipratropium  3 ml  09/21/21 01:33  09/21/21 01:34





  Albuterol/Ipratropium 3.0-0.5 Mg/3 Ml Neb Soln  NEB  09/21/21 01:34  3 ml





  ONETIME ONE   Administration


 


Amiodarone HCl  Confirm  09/21/21 08:37  09/21/21 08:44





  Amiodarone 200 Mg Tab  Administered  09/21/21 08:38  Not Given





  Dose  





  400 mg  





  .ROUTE  





  .STK-MED ONE  


 


Ceftriaxone Sodium  Confirm  09/21/21 02:51  09/21/21 06:07





  Ceftriaxone 1 Gm Vial  Administered  09/21/21 02:52  Not Given





  Dose  





  1 gm  





  .ROUTE  





  .STK-MED ONE  


 


Furosemide  40 mg  09/21/21 05:01 





  Furosemide 40 Mg/4 Ml Vial  IVPUSH  09/21/21 05:02 





  NOW ONE  


 


Furosemide  Confirm  09/21/21 07:50  09/21/21 08:25





  Furosemide 40 Mg/4 Ml Vial  Administered  09/21/21 07:51  Not Given





  Dose  





  40 mg  





  .ROUTE  





  .STK-MED ONE  


 


Sodium Chloride  1,000 mls @ 500 mls/hr  09/21/21 01:52  09/21/21 05:30





  Normal Saline  IV  09/21/21 03:51  500 mls/hr





  .BOLUS ONE   Administration


 


Ceftriaxone Sodium 1 gm/  50 mls @ 100 mls/hr  09/21/21 02:28  09/21/21 02:46





  Sodium Chloride  IV  09/21/21 02:57  100 mls/hr





  ONETIME ONE   Administration


 


Azithromycin 500 mg/ Sodium  250 mls @ 250 mls/hr  09/21/21 02:28  09/21/21 

03:25





  Chloride  IV  09/21/21 03:27  250 mls/hr





  ONETIME ONE   Administration


 


Morphine Sulfate  Confirm  09/21/21 04:34  09/21/21 06:08





  Morphine 2 Mg/Ml Syringe  Administered  09/21/21 04:35  Not Given





  Dose  





  2 mg  





  .ROUTE  





  .STK-MED ONE  














- Radiology Interpretation


Free Text/Narrative:: 





CXR shows possible Pneumonia on the Rt mid and lower lobe.





- Re-Assessments/Exams


Free Text/Narrative Re-Assessment/Exam: 





09/21/21 08:58


Labs reveal a WBC of 16,000 - Neutrophils 90 -  BNP 21,828 - Lactic acid 3.0 - 

BUN 20 - CREAT - 2.0 - Covid is negative.


Pt is diagnosed with Pneumonia, Acute CHF - and Sepsis.


He was given Zofran IV - Normal saline bolus - Rocephin and Zithromax. His 

condition started to improve.


BP slowly came up to 90/60. Levophed was started at 4. This also improved his BP

to 133/60.


I discussed his code status with family, and he is currently full code.


The pt refuses to be transferred anywhere. He wants to stay here.


I called the E-hospitalist Dr Chinchilla, who told me he could not accept this pt - 

he is too critical and a full code.


I then had further discussion with the Pt and family, who now agreed that he 

should be DNR/DNI.


I then called Dr Chinchilla back and he still would not accept the pt, because he 

was on 10 liters of 02 and a pressor.


He told me again the pt should not be in [place].


I relayed this information to the pt, who is more alert now and family.


He does not want to be transferred. They will take their chances here he told 

me.


His 02 has been weaned down to 6 liters by this time.


The pt was taken to the floor - A bonner was placed.


His PCP Dr Bender will be called in the morning to assist with this pt's care.





Departure





- Departure


Time of Disposition: 05:00


Disposition: DC/Tfer to [facility]


Condition: Fair, Critical


Clinical Impression: 


 Sepsis associated hypotension





Congestive heart failure


Qualifiers:


 Heart failure type: combined systolic and diastolic Heart failure chronicity: 

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 

(congestive) and diastolic (congestive) heart failure





Pneumonia


Qualifiers:


 Pneumonia type: due to unspecified organism Laterality: right Lung location: 

lower lobe of lung Qualified Code(s): J18.9 - Pneumonia, unspecified organism








- Discharge Information


*PRESCRIPTION DRUG MONITORING PROGRAM REVIEWED*: Yes


*COPY OF PRESCRIPTION DRUG MONITORING REPORT IN PATIENT LILLIAM: Yes





Sepsis Event Note (ED)





- Focused Exam


Vital Signs: 


                                   Vital Signs











  Temp Pulse Resp BP Pulse Ox


 


 09/21/21 03:30   113 H  20  76/47 L  100


 


 09/21/21 03:00   120 H  20  61/36 L  100


 


 09/21/21 02:30   130 H  24 H  62/44 L  95


 


 09/21/21 02:00   144 H  22 H  62/10 L  95


 


 09/21/21 01:30  98.9 F  144 H  28 H  51/16 L  81 L














- My Orders


Last 24 Hours: 


My Active Orders





09/21/21 01:34


RT Aerosol Therapy [RC] ASDIRECTED 





09/21/21 01:35


EKG Documentation Completion [RC] ASDIRECTED 


EKG 12 Lead [EK] Stat 





09/21/21 02:20


CULTURE BLOOD [BC] Stat 





09/21/21 02:45


Norepinephrine [Levophed] 4 mg   Dextrose 5% in Water 246 ml IV TITRATE 





09/21/21 03:03


UA W/MICROSCOPIC [URIN] Stat 





09/21/21 03:50


Morphine   2 mg IVPUSH Q1H PRN 














- Assessment/Plan


Last 24 Hours: 


My Active Orders





09/21/21 01:34


RT Aerosol Therapy [RC] ASDIRECTED 





09/21/21 01:35


EKG Documentation Completion [RC] ASDIRECTED 


EKG 12 Lead [EK] Stat 





09/21/21 02:20


CULTURE BLOOD [BC] Stat 





09/21/21 02:45


Norepinephrine [Levophed] 4 mg   Dextrose 5% in Water 246 ml IV TITRATE 





09/21/21 03:03


UA W/MICROSCOPIC [URIN] Stat 





09/21/21 03:50


Morphine   2 mg IVPUSH Q1H PRN

## 2021-09-22 NOTE — CR
Date of Service:  09/22/21

Clinical Data:  pneumonia



AP CHEST: 



Comparison is made to a prior exam dated 09/21/21. 



The patient has taken a poor inspiration. 



The patient is status post median sternotomy.  The heart remains enlarged, 
unchanged.  The cardiac pacer and pacer wires remain unchanged in position.  



Both lung bases appear clearer than on the prior exam.  The small bilateral 
pleural effusions appear to be decreased from the prior study.  



The exam is otherwise unchanged.  No new abnormalities.  



[id number]

[facility]

## 2021-09-22 NOTE — PCM.PN
- General Info


Date of Service: 09/22/21


Subjective Update: 





ICU Day #2 ; In 1.25L Output 1770mL


Patient was weaned off levophed last night and oxygen titrated. He had a V-tach 

event and his pacer defibrillated. Thereafter he required a return to levophed. 

This morning during rounds he had another V-tach event and was defibrillated by 

his pacer/defib. He remains in good spirits at this time. His condition improved

and worsened overnight. He remains critical at this time. Patient remains 

tachycardic with a fever last night and tylenol administration which improved 

his fever.  





- Review of Systems


General: Reports: Weakness


HEENT: Reports: No Symptoms


Pulmonary: Reports: No Symptoms


Cardiovascular: Reports: Dyspnea on Exertion, Orthopnea


Gastrointestinal: Reports: Decreased Appetite


Genitourinary: Reports: No Symptoms


Musculoskeletal: Reports: No Symptoms


Skin: Reports: Pallor


Neurological: Reports: Weakness


Psychiatric: Reports: No Symptoms





- Patient Data


Vitals - Most Recent: 


                                Last Vital Signs











Temp  37.1 C   09/22/21 11:00


 


Pulse  102 H  09/22/21 11:00


 


Resp  17   09/22/21 11:00


 


BP  98/64   09/22/21 11:00


 


Pulse Ox  100   09/22/21 11:00











Weight - Most Recent: 63.503 kg


I&O - Last 24 Hours: 


                                 Intake & Output











 09/21/21 09/22/21 09/22/21





 22:59 06:59 14:59


 


Intake Total 910 1400 


 


Output Total 5000 1325 450


 


Balance -4090 75 -450











Lab Results Last 24 Hours: 


                         Laboratory Results - last 24 hr











  09/21/21 09/21/21 09/21/21 Range/Units





  14:00 14:33 15:00 


 


WBC  15.3 H    (4.0-11.0)  K/uL


 


RBC  3.04 L    (4.50-6.50)  M/uL


 


Hgb  9.7 L    (13.0-18.0)  g/dL


 


Hct  30.2 L    (40.0-54.0)  %


 


MCV  99 H    (76-96)  fL


 


MCH  31.9    (27.0-32.0)  pg


 


MCHC  32.1    (31.0-35.0)  g/dL


 


RDW  14.8    (11.0-16.0)  %


 


Plt Count  221    (150-400)  K/uL


 


MPV  10.5 H    (6.0-10.0)  fL


 


Neut % (Auto)  90.0 H    (45.0-70.0)  %


 


Lymph % (Auto)  4.6 L    (20.0-40.0)  %


 


Mono % (Auto)  5.2    (3.0-10.0)  %


 


Eos % (Auto)  0.1 L    (1.0-5.0)  %


 


Baso % (Auto)  0.1    (0.0-0.5)  %


 


Neut # (Auto)  13.78 H    (2.00-7.50)  K/uL


 


Lymph # (Auto)  0.71 L    (1.50-4.00)  K/uL


 


Mono # (Auto)  0.79    (0.20-0.80)  K/uL


 


Eos # (Auto)  0.01 L    (0.04-0.40)  K/uL


 


Baso # (Auto)  0.01 L    (0.02-0.10)  K/uL


 


Sodium   142   (136-145)  mmol/L


 


Potassium   3.8   (3.5-5.1)  mmol/L


 


Chloride   108 H   ()  mmol/L


 


Carbon Dioxide   27.0   (21.0-32.0)  mmol/L


 


Anion Gap   10.8   (5.0-15.0)  mmol/L


 


BUN   17   (8-26)  mg/dL


 


Creatinine   1.74 H   (0.70-1.30)  mg/dL


 


Est Cr Clr Drug Dosing   26.93   mL/min


 


Estimated GFR (MDRD)   38 L   (>60)  MLS/MIN


 


BUN/Creatinine Ratio   9.8   (6-25)  


 


Glucose   136 H D   ()  mg/dL


 


Lactic Acid     (0.4-2.0)  mmol/L


 


Calcium   8.0 L   (8.5-10.1)  mg/dL


 


Magnesium   1.9   (1.8-2.4)  mg/dL


 


Total Bilirubin   1.0   (0.0-1.0)  mg/dL


 


AST   19   (15-37)  U/L


 


ALT   21   (12-78)  U/L


 


Alkaline Phosphatase   61   ()  U/L


 


Total Protein   6.7   (6.4-8.2)  g/dL


 


Albumin   2.6 L   (3.4-5.0)  g/dL


 


Globulin   4.1   (2.2-4.2)  g/dL


 


Albumin/Globulin Ratio   0.6 L   (0.8-2.0)  


 


TSH, Ultra Sensitive   1.405   (0.358-3.740)  uIU/mL


 


Urine Color    Yellow  


 


Urine Appearance    Slightly cloudy  (CLEAR)  


 


Urine pH    7.0  (5.0-8.0)  


 


Ur Specific Gravity    1.020  (1.003-1.030)  


 


Urine Protein    Negative  (NEGATIVE)  mg/dL


 


Urine Glucose (UA)    Negative  (NEGATIVE)  mg/dL


 


Urine Ketones    Negative  (NEGATIVE)  mg/dL


 


Urine Occult Blood    Moderate H  (NEGATIVE)  


 


Urine Nitrite    Negative  (NEGATIVE)  


 


Urine Bilirubin    Negative  (NEGATIVE)  


 


Urine Urobilinogen    0.2  (0.2-1.0)  E.U./dL


 


Ur Leukocyte Esterase    Small H  (NEGATIVE)  


 


Urine RBC    40-50 H  /HPF


 


Urine WBC    5-10 H  /HPF


 


Urine WBC Clumps    Few  /HPF


 


Ur Squamous Epith Cells    Moderate  /HPF


 


Urine Bacteria    Few  /HPF














  09/22/21 09/22/21 09/22/21 Range/Units





  07:15 07:15 07:15 


 


WBC  14.2 H    (4.0-11.0)  K/uL


 


RBC  2.83 L    (4.50-6.50)  M/uL


 


Hgb  9.0 L    (13.0-18.0)  g/dL


 


Hct  27.6 L    (40.0-54.0)  %


 


MCV  98 H    (76-96)  fL


 


MCH  31.8    (27.0-32.0)  pg


 


MCHC  32.6    (31.0-35.0)  g/dL


 


RDW  14.6    (11.0-16.0)  %


 


Plt Count  195    (150-400)  K/uL


 


MPV  10.9 H    (6.0-10.0)  fL


 


Neut % (Auto)  91.0 H    (45.0-70.0)  %


 


Lymph % (Auto)  3.4 L    (20.0-40.0)  %


 


Mono % (Auto)  5.6    (3.0-10.0)  %


 


Eos % (Auto)  0.0 L    (1.0-5.0)  %


 


Baso % (Auto)  0.0    (0.0-0.5)  %


 


Neut # (Auto)  12.91 H    (2.00-7.50)  K/uL


 


Lymph # (Auto)  0.48 L    (1.50-4.00)  K/uL


 


Mono # (Auto)  0.80    (0.20-0.80)  K/uL


 


Eos # (Auto)  0.00 L    (0.04-0.40)  K/uL


 


Baso # (Auto)  0.00 L    (0.02-0.10)  K/uL


 


Sodium   140   (136-145)  mmol/L


 


Potassium   2.7 L* D   (3.5-5.1)  mmol/L


 


Chloride   107   ()  mmol/L


 


Carbon Dioxide   22.5   (21.0-32.0)  mmol/L


 


Anion Gap   13.2   (5.0-15.0)  mmol/L


 


BUN   14   (8-26)  mg/dL


 


Creatinine   1.40 H   (0.70-1.30)  mg/dL


 


Est Cr Clr Drug Dosing   33.48   mL/min


 


Estimated GFR (MDRD)   48 L   (>60)  MLS/MIN


 


BUN/Creatinine Ratio   10.0   (6-25)  


 


Glucose   95  D   ()  mg/dL


 


Lactic Acid    1.0  (0.4-2.0)  mmol/L


 


Calcium   7.6 L   (8.5-10.1)  mg/dL


 


Magnesium   1.5 L   (1.8-2.4)  mg/dL


 


Total Bilirubin   1.0   (0.0-1.0)  mg/dL


 


AST   13 L   (15-37)  U/L


 


ALT   14   (12-78)  U/L


 


Alkaline Phosphatase   49   ()  U/L


 


Total Protein   5.7 L   (6.4-8.2)  g/dL


 


Albumin   2.3 L   (3.4-5.0)  g/dL


 


Globulin   3.4   (2.2-4.2)  g/dL


 


Albumin/Globulin Ratio   0.7 L   (0.8-2.0)  


 


TSH, Ultra Sensitive     (0.358-3.740)  uIU/mL


 


Urine Color     


 


Urine Appearance     (CLEAR)  


 


Urine pH     (5.0-8.0)  


 


Ur Specific Gravity     (1.003-1.030)  


 


Urine Protein     (NEGATIVE)  mg/dL


 


Urine Glucose (UA)     (NEGATIVE)  mg/dL


 


Urine Ketones     (NEGATIVE)  mg/dL


 


Urine Occult Blood     (NEGATIVE)  


 


Urine Nitrite     (NEGATIVE)  


 


Urine Bilirubin     (NEGATIVE)  


 


Urine Urobilinogen     (0.2-1.0)  E.U./dL


 


Ur Leukocyte Esterase     (NEGATIVE)  


 


Urine RBC     /HPF


 


Urine WBC     /HPF


 


Urine WBC Clumps     /HPF


 


Ur Squamous Epith Cells     /HPF


 


Urine Bacteria     /HPF











Gabe Results Last 24 Hours: 


                                  Microbiology











 09/21/21 02:20 Aerobic Blood Culture - Preliminary





 Blood    NO GROWTH AFTER 1 DAY





 Anaerobic Blood Culture - Preliminary





    NO GROWTH AFTER 1 DAY











Med Orders - Current: 


                               Current Medications





Acetaminophen (Acetaminophen 650 Mg Tab.Er)  650 mg PO Q4H PRN


   PRN Reason: Pain (severe 7-10)


   Last Admin: 09/22/21 06:15 Dose:  650 mg


   Documented by: 


Apixaban (Apixaban 5 Mg Tab)  5 mg PO BID [facility]


   Last Admin: 09/22/21 07:50 Dose:  5 mg


   Documented by: 


Aspirin (Aspirin 81 Mg Tab.Chew)  81 mg PO DAILY [facility]


   Last Admin: 09/22/21 07:49 Dose:  81 mg


   Documented by: 


Bumetanide (Bumetanide 1 Mg Tab)  1 mg PO ASDIRECTED [facility]


Famotidine (Famotidine 20 Mg Tab)  20 mg PO BID [facility]


   Last Admin: 09/22/21 07:49 Dose:  20 mg


   Documented by: 


Furosemide (Furosemide 20 Mg/2 Ml Vial)  20 mg IVPUSH NOW PRN


   PRN Reason: Dyspnea


   Last Admin: 09/22/21 06:45 Dose:  20 mg


   Documented by: 


Furosemide (Furosemide 40 Mg/4 Ml Vial)  40 mg IVPUSH DAILY [facility]


   Last Admin: 09/22/21 08:48 Dose:  40 mg


   Documented by: 


Hydroxyzine HCl (Hydroxyzine Hcl 25 Mg Tab)  25 mg PO QID PRN


   PRN Reason: Itching


Promethazine HCl 6.25 mg/ (Sodium Chloride)  50.25 mls @ 200 mls/hr IV Q6H PRN


   PRN Reason: Nausea/Vomiting


Ceftriaxone Sodium 1 gm/ (Sodium Chloride)  50 mls @ 100 mls/hr IV Q24H [facility]


   Last Admin: 09/21/21 16:12 Dose:  100 mls/hr


   Documented by: 


Azithromycin 500 mg/ Sodium (Chloride)  250 mls @ 250 mls/hr IV Q24H [facility]


   Last Admin: 09/21/21 16:54 Dose:  250 mls/hr


   Documented by: 


Albumin Human (Flexbumin 25%)  200 mls @ 50 mls/hr IV DAILY [facility]


   Last Admin: 09/22/21 08:24 Dose:  50 mls/hr


   Documented by: 


Potassium Chloride/Sodium Chloride (Normal Saline With 20 Meq Kcl)  1,000 mls @ 

100 mls/hr IV ASDIRECTED [facility]


Norepinephrine Bitartrate 4 mg (/ Dextrose/Water)  250 mls @ 7.5 mls/hr IV 

TITRATE [facility]; Protocol


   Last Admin: 09/22/21 09:58 Dose:  3 mcg/min, 11.25 mls/hr


   Documented by: 


Levothyroxine Sodium (Levothyroxine 100 Mcg Tab)  100 mcg PO ACBREAKFAST [facility]


   Last Admin: 09/22/21 07:50 Dose:  100 mcg


   Documented by: 


Metoprolol Tartrate (Metoprolol Tartrate 25 Mg Tab)  25 mg PO BID [facility]


   Last Admin: 09/22/21 07:50 Dose:  Not Given


   Documented by: 


Morphine Sulfate (Morphine 2 Mg/Ml Syringe)  2 mg IVPUSH Q1H PRN


   PRN Reason: Pain


   Last Admin: 09/21/21 04:35 Dose:  2 mg


   Documented by: 


Nitroglycerin (Nitroglycerin 0.4 Mg Tab.Sl)  0.4 mg SL Q5M PRN


   PRN Reason: Chest Pain


Non-Formulary Medication (Amiodarone Hcl [Amiodarone Hcl])  400 mg PO BID [facility]


   Last Admin: 09/22/21 07:50 Dose:  400 mg


   Documented by: 


Non-Formulary Medication (Budesonide/Formoterol [Symbicort 160-4.5 Mcg])  1 puff

INH BID [facility]


   Last Admin: 09/22/21 09:58 Dose:  Not Given


   Documented by: 


Non-Formulary Medication (Timolol Maleate [Timoptic 0.25% Ophth Soln])  1 drop 

EYELF BEDTIME [facility]


   Last Admin: 09/21/21 20:00 Dose:  1 drop


   Documented by: 


Ondansetron HCl (Ondansetron 4 Mg Tab.Dis)  4 mg PO Q4HR PRN


   PRN Reason: Nausea


Tamsulosin HCl (Tamsulosin 0.4 Mg Cap.Er)  0.4 mg PO BEDTIME [facility]


   Last Admin: 09/21/21 20:00 Dose:  0.4 mg


   Documented by: 





Discontinued Medications





Albuterol/Ipratropium (Albuterol/Ipratropium 3.0-0.5 Mg/3 Ml Neb Soln)  3 ml NEB

ONETIME ONE


   Stop: 09/21/21 01:34


   Last Admin: 09/21/21 01:34 Dose:  3 ml


   Documented by: 


Amiodarone HCl (Amiodarone 200 Mg Tab) Confirm Administered Dose 400 mg .ROUTE 

.STK-MED ONE


   Stop: 09/21/21 08:38


   Last Admin: 09/21/21 08:44 Dose:  Not Given


   Documented by: 


Amiodarone HCl (Amiodarone 200 Mg Tab) Confirm Administered Dose 400 mg .ROUTE 

.STK-MED ONE


   Stop: 09/22/21 07:16


   Last Admin: 09/22/21 07:42 Dose:  Not Given


   Documented by: 


Ceftriaxone Sodium (Ceftriaxone 1 Gm Vial) Confirm Administered Dose 1 gm .ROUTE

.STK-MED ONE


   Stop: 09/21/21 02:52


   Last Admin: 09/21/21 06:07 Dose:  Not Given


   Documented by: 


Furosemide (Furosemide 40 Mg/4 Ml Vial)  40 mg IVPUSH NOW ONE


   Stop: 09/21/21 05:02


   Last Admin: 09/21/21 09:30 Dose:  40 mg


   Documented by: 


Furosemide (Furosemide 40 Mg/4 Ml Vial) Confirm Administered Dose 40 mg .ROUTE 

.STK-MED ONE


   Stop: 09/21/21 07:51


   Last Admin: 09/21/21 08:25 Dose:  Not Given


   Documented by: 


Hydroxyzine HCl (Hydroxyzine Hcl 25 Mg Tab)  25 mg PO QID [facility]


   Last Admin: 09/21/21 17:40 Dose:  Not Given


   Documented by: 


Sodium Chloride (Normal Saline)  1,000 mls @ 500 mls/hr IV .BOLUS ONE


   Stop: 09/21/21 03:51


   Last Admin: 09/21/21 05:30 Dose:  500 mls/hr


   Documented by: 


Ceftriaxone Sodium 1 gm/ (Sodium Chloride)  50 mls @ 100 mls/hr IV ONETIME ONE


   Stop: 09/21/21 02:57


   Last Admin: 09/21/21 02:46 Dose:  100 mls/hr


   Documented by: 


Azithromycin 500 mg/ Sodium (Chloride)  250 mls @ 250 mls/hr IV ONETIME ONE


   Stop: 09/21/21 03:27


   Last Admin: 09/21/21 03:25 Dose:  250 mls/hr


   Documented by: 


Norepinephrine Bitartrate 4 mg (/ Dextrose/Water)  250 mls @ 7.5 mls/hr IV 

TITRATE [facility]; Protocol


   Last Titration: 09/22/21 03:00 Dose:  1.5 mcg/min, 5.625 mls/hr


   Documented by: 


Albumin Human (Flexbumin 25%)  200 mls @ 100 mls/hr IV Q12HR ONE


   Stop: 09/21/21 11:01


   Last Admin: 09/21/21 14:16 Dose:  Not Given


   Documented by: 


Sodium Chloride (Normal Saline)  1,000 mls @ 100 mls/hr IV ASDIRECTED [facility]


   Last Admin: 09/21/21 18:00 Dose:  100 mls/hr


   Documented by: 


Sodium Chloride (Normal Saline)  1,000 mls @ 250 mls/hr IV ASDIRECTED [facility]


   Last Admin: 09/21/21 09:15 Dose:  250 mls/hr


   Documented by: 


Morphine Sulfate (Morphine 2 Mg/Ml Syringe) Confirm Administered Dose 2 mg 

.ROUTE .STK-MED ONE


   Stop: 09/21/21 04:35


   Last Admin: 09/21/21 06:08 Dose:  Not Given


   Documented by: 











- Exam


Quality Assessment: Supplemental Oxygen


Urinary Catheter Total Time: 1Days  5Hours


General: Alert, Oriented, Cooperative


HEENT: Pupils Equal, Pupils Reactive, EOMI


Neck: Supple


Lungs: Decreased Breath Sounds, Crackles (RLL)


Cardiovascular: Tachycardia, Other (V-tach, PVCs)


GI/Abdominal Exam: Soft, Non-Tender, Abnormal Bowel Sounds (decreased)


Back Exam: Normal Inspection


Extremities: Normal Inspection


Peripheral Pulses: 2+: Dorsalis Pedis (L), Dorsalis Pedis (R)


Skin: Dry, Intact, Cool


Neurological: No New Focal Deficit


Psy/Mental Status: Alert, Normal Affect, Normal Mood





- Patient Data


Lab Results Last 24 hrs: 


                         Laboratory Results - last 24 hr











  09/21/21 09/21/21 09/21/21 Range/Units





  14:00 14:33 15:00 


 


WBC  15.3 H    (4.0-11.0)  K/uL


 


RBC  3.04 L    (4.50-6.50)  M/uL


 


Hgb  9.7 L    (13.0-18.0)  g/dL


 


Hct  30.2 L    (40.0-54.0)  %


 


MCV  99 H    (76-96)  fL


 


MCH  31.9    (27.0-32.0)  pg


 


MCHC  32.1    (31.0-35.0)  g/dL


 


RDW  14.8    (11.0-16.0)  %


 


Plt Count  221    (150-400)  K/uL


 


MPV  10.5 H    (6.0-10.0)  fL


 


Neut % (Auto)  90.0 H    (45.0-70.0)  %


 


Lymph % (Auto)  4.6 L    (20.0-40.0)  %


 


Mono % (Auto)  5.2    (3.0-10.0)  %


 


Eos % (Auto)  0.1 L    (1.0-5.0)  %


 


Baso % (Auto)  0.1    (0.0-0.5)  %


 


Neut # (Auto)  13.78 H    (2.00-7.50)  K/uL


 


Lymph # (Auto)  0.71 L    (1.50-4.00)  K/uL


 


Mono # (Auto)  0.79    (0.20-0.80)  K/uL


 


Eos # (Auto)  0.01 L    (0.04-0.40)  K/uL


 


Baso # (Auto)  0.01 L    (0.02-0.10)  K/uL


 


Sodium   142   (136-145)  mmol/L


 


Potassium   3.8   (3.5-5.1)  mmol/L


 


Chloride   108 H   ()  mmol/L


 


Carbon Dioxide   27.0   (21.0-32.0)  mmol/L


 


Anion Gap   10.8   (5.0-15.0)  mmol/L


 


BUN   17   (8-26)  mg/dL


 


Creatinine   1.74 H   (0.70-1.30)  mg/dL


 


Est Cr Clr Drug Dosing   26.93   mL/min


 


Estimated GFR (MDRD)   38 L   (>60)  MLS/MIN


 


BUN/Creatinine Ratio   9.8   (6-25)  


 


Glucose   136 H D   ()  mg/dL


 


Lactic Acid     (0.4-2.0)  mmol/L


 


Calcium   8.0 L   (8.5-10.1)  mg/dL


 


Magnesium   1.9   (1.8-2.4)  mg/dL


 


Total Bilirubin   1.0   (0.0-1.0)  mg/dL


 


AST   19   (15-37)  U/L


 


ALT   21   (12-78)  U/L


 


Alkaline Phosphatase   61   ()  U/L


 


Total Protein   6.7   (6.4-8.2)  g/dL


 


Albumin   2.6 L   (3.4-5.0)  g/dL


 


Globulin   4.1   (2.2-4.2)  g/dL


 


Albumin/Globulin Ratio   0.6 L   (0.8-2.0)  


 


TSH, Ultra Sensitive   1.405   (0.358-3.740)  uIU/mL


 


Urine Color    Yellow  


 


Urine Appearance    Slightly cloudy  (CLEAR)  


 


Urine pH    7.0  (5.0-8.0)  


 


Ur Specific Gravity    1.020  (1.003-1.030)  


 


Urine Protein    Negative  (NEGATIVE)  mg/dL


 


Urine Glucose (UA)    Negative  (NEGATIVE)  mg/dL


 


Urine Ketones    Negative  (NEGATIVE)  mg/dL


 


Urine Occult Blood    Moderate H  (NEGATIVE)  


 


Urine Nitrite    Negative  (NEGATIVE)  


 


Urine Bilirubin    Negative  (NEGATIVE)  


 


Urine Urobilinogen    0.2  (0.2-1.0)  E.U./dL


 


Ur Leukocyte Esterase    Small H  (NEGATIVE)  


 


Urine RBC    40-50 H  /HPF


 


Urine WBC    5-10 H  /HPF


 


Urine WBC Clumps    Few  /HPF


 


Ur Squamous Epith Cells    Moderate  /HPF


 


Urine Bacteria    Few  /HPF














  09/22/21 09/22/21 09/22/21 Range/Units





  07:15 07:15 07:15 


 


WBC  14.2 H    (4.0-11.0)  K/uL


 


RBC  2.83 L    (4.50-6.50)  M/uL


 


Hgb  9.0 L    (13.0-18.0)  g/dL


 


Hct  27.6 L    (40.0-54.0)  %


 


MCV  98 H    (76-96)  fL


 


MCH  31.8    (27.0-32.0)  pg


 


MCHC  32.6    (31.0-35.0)  g/dL


 


RDW  14.6    (11.0-16.0)  %


 


Plt Count  195    (150-400)  K/uL


 


MPV  10.9 H    (6.0-10.0)  fL


 


Neut % (Auto)  91.0 H    (45.0-70.0)  %


 


Lymph % (Auto)  3.4 L    (20.0-40.0)  %


 


Mono % (Auto)  5.6    (3.0-10.0)  %


 


Eos % (Auto)  0.0 L    (1.0-5.0)  %


 


Baso % (Auto)  0.0    (0.0-0.5)  %


 


Neut # (Auto)  12.91 H    (2.00-7.50)  K/uL


 


Lymph # (Auto)  0.48 L    (1.50-4.00)  K/uL


 


Mono # (Auto)  0.80    (0.20-0.80)  K/uL


 


Eos # (Auto)  0.00 L    (0.04-0.40)  K/uL


 


Baso # (Auto)  0.00 L    (0.02-0.10)  K/uL


 


Sodium   140   (136-145)  mmol/L


 


Potassium   2.7 L* D   (3.5-5.1)  mmol/L


 


Chloride   107   ()  mmol/L


 


Carbon Dioxide   22.5   (21.0-32.0)  mmol/L


 


Anion Gap   13.2   (5.0-15.0)  mmol/L


 


BUN   14   (8-26)  mg/dL


 


Creatinine   1.40 H   (0.70-1.30)  mg/dL


 


Est Cr Clr Drug Dosing   33.48   mL/min


 


Estimated GFR (MDRD)   48 L   (>60)  MLS/MIN


 


BUN/Creatinine Ratio   10.0   (6-25)  


 


Glucose   95  D   ()  mg/dL


 


Lactic Acid    1.0  (0.4-2.0)  mmol/L


 


Calcium   7.6 L   (8.5-10.1)  mg/dL


 


Magnesium   1.5 L   (1.8-2.4)  mg/dL


 


Total Bilirubin   1.0   (0.0-1.0)  mg/dL


 


AST   13 L   (15-37)  U/L


 


ALT   14   (12-78)  U/L


 


Alkaline Phosphatase   49   ()  U/L


 


Total Protein   5.7 L   (6.4-8.2)  g/dL


 


Albumin   2.3 L   (3.4-5.0)  g/dL


 


Globulin   3.4   (2.2-4.2)  g/dL


 


Albumin/Globulin Ratio   0.7 L   (0.8-2.0)  


 


TSH, Ultra Sensitive     (0.358-3.740)  uIU/mL


 


Urine Color     


 


Urine Appearance     (CLEAR)  


 


Urine pH     (5.0-8.0)  


 


Ur Specific Gravity     (1.003-1.030)  


 


Urine Protein     (NEGATIVE)  mg/dL


 


Urine Glucose (UA)     (NEGATIVE)  mg/dL


 


Urine Ketones     (NEGATIVE)  mg/dL


 


Urine Occult Blood     (NEGATIVE)  


 


Urine Nitrite     (NEGATIVE)  


 


Urine Bilirubin     (NEGATIVE)  


 


Urine Urobilinogen     (0.2-1.0)  E.U./dL


 


Ur Leukocyte Esterase     (NEGATIVE)  


 


Urine RBC     /HPF


 


Urine WBC     /HPF


 


Urine WBC Clumps     /HPF


 


Ur Squamous Epith Cells     /HPF


 


Urine Bacteria     /HPF











Result Diagrams: 


                                 09/22/21 07:15





                                 09/22/21 07:15


Gabe Results Last 24 hrs: 


                                  Microbiology











 09/21/21 02:20 Aerobic Blood Culture - Preliminary





 Blood    NO GROWTH AFTER 1 DAY





 Anaerobic Blood Culture - Preliminary





    NO GROWTH AFTER 1 DAY














Sepsis Event Note





- Evaluation


Sepsis Screening Result: Possible Sepsis Risk





- Focused Exam


Vital Signs: 


                                   Vital Signs











  Temp Temp Pulse Resp BP Pulse Ox Pulse Ox


 


 09/22/21 11:00   37.1 C  102 H  17  98/64  100 


 


 09/22/21 07:48  37.3 C  38.4 C H  143 H  18  94/55 L  98 


 


 09/22/21 04:00   39.1 C H   18  98/67  97 


 


 09/22/21 01:43        100


 


 09/22/21 00:00   37.1 C  112 H   122/75  100 














- Problem List Review


Problem List Initiated/Reviewed/Updated: Yes





- My Orders


Last 24 Hours: 


My Active Orders





09/21/21 14:00


cefTRIAXone [Rocephin] 1 gm   Sodium Chloride 0.9% [Normal Saline] 50 ml IV Q24H







09/21/21 15:00


Azithromycin [Zithromax] 500 mg   Sodium Chloride 0.9% [Normal Saline (AdvBag)] 

250 ml IV Q24H 





09/21/21 18:58


Furosemide [Lasix]   20 mg IVPUSH NOW PRN 





09/22/21 05:11


Chest 1V Frontal [CR] AM 





09/22/21 08:00


Furosemide [Lasix]   40 mg IVPUSH DAILY 





09/22/21 08:30


NS + KCl 20mEq/L [Normal Saline with 20 mEq KCl] 1,000 ml IV ASDIRECTED 





09/23/21 05:11


LACTIC ACID SEPSIS W/ REFLEX [LACTATE SEPSIS W/ REFLEX] [CHEM] DAILY 





09/24/21 05:11


LACTIC ACID SEPSIS W/ REFLEX [LACTATE SEPSIS W/ REFLEX] [CHEM] DAILY 














- Plan


Plan:: 





Patient in ICU Day 1 with [place] II score 23 with estimated mortality rate of 

40%.





Cardiogenic shock/Hypotension/CHF - Maintain pressors and titrate. Cardiac 

monitoring. BNP 79145 (prior BNP 2 months ago 5462). 


Pneumonia - Rocephin and Azithromycin daily dosing. F/u CBC today and in the AM 

for further management. 


Sepsis - [place] at [address] - f/u CBC. Fluid resuscitation - monitor 

pulmonary congestion at the same time. Continue IV antibiotics. 


Acute renal failure - Gentle hydration and repeat BUN/Cr. F/u labs daily and as 

needed. Renal diet. 


Anemia - Consider supplementation. F/u CBC and monitor any further drop for 

blood loss. 


COPD - On 3L nasal canula; continue current inhaler and nebulizer treatment. 


Metastatic Pancreatic Cancer - Palliative care patient. May choose to follow up 

with Oncology as directed. 


Hypothyroidism - Stable continue on current medication. 


DVT prophylaxis - SCD's. 





9/22/23 ICU Day #2





Cardiogenic shock/Hypotension/CHF - Maintain pressors and titrate. Cardiac 

monitoring. BNP 21585 (prior BNP 2 months ago 5462). Patient was weaned off last

night but had a V-tach and defibrillation event and required to return to 

pressors. Consideration for central line discussed with family by Alexis and plan

for family meeting for [id number] as decision by Juan to stop his defibrillator 

and pacer. Place on comfort/palliative care. 


Pneumonia - Rocephin and Azithromycin daily dosing. F/u CBC today and in the AM 

for further management. Likely discontinue when placed into comfort care. 


Sepsis - [place] [address] - f/u CBC. Fluid resuscitation - monitor 

pulmonary congestion at the same time. Monitor symptoms and may d/c further 

labs. 


Acute renal failure - F/u as directed at this time. 


Anemia - F/u as directed.  


COPD - On 3L nasal canula; continue current inhaler and nebulizer treatment for 

comfort. 


Metastatic Pancreatic Cancer - Palliative care patient. May choose to follow up 

with Oncology as directed. 


Hypothyroidism - Stable. 


DVT prophylaxis - SCD's.

## 2021-09-23 NOTE — PCM.PN
- General Info


Date of Service: 09/23/21


Subjective Update: 





Day#3 ICU 


Patient shows improvement in symptoms. He denies any issues. Last night per 

patient defibrillator was discontinued with magnet after discussion of risks 

including up to death. Patient did not want to be shocked any more. He denies 

any current concerns. Family present and a care conference was setup for 

discussion on plan of care. Discussed end of life and comfort cares versus 

continued care and management including transfer. Patient does not want heroic 

measures but does request continued medication management including levophed at 

this time. Patient is alert today and oriented and understands the risks and 

benefits of his plan of care. 


Functional Status: Reports: Pain Controlled





- Review of Systems


General: Reports: Weakness


HEENT: Reports: No Symptoms


Pulmonary: Reports: Other (chest congestion)


Cardiovascular: Reports: Dyspnea on Exertion, Other (prequent PVC and occasional

V-tach).  Denies: Chest Pain, Lightheadedness


Gastrointestinal: Reports: No Symptoms


Genitourinary: Reports: No Symptoms


Musculoskeletal: Reports: No Symptoms


Skin: Reports: No Symptoms


Neurological: Reports: Weakness





- Patient Data


Vitals - Most Recent: 


                                Last Vital Signs











Temp  37.2 C   09/23/21 06:40


 


Pulse  93   09/23/21 15:36


 


Resp  16   09/23/21 09:35


 


BP  78/58 L  09/23/21 15:36


 


Pulse Ox  100   09/23/21 15:36











Weight - Most Recent: 63.503 kg


I&O - Last 24 Hours: 


                                 Intake & Output











 09/23/21 09/23/21 09/23/21





 06:59 14:59 22:59


 


Intake Total 2488  


 


Output Total 2775  


 


Balance -287  











Lab Results Last 24 Hours: 


                         Laboratory Results - last 24 hr











  09/23/21 09/23/21 09/23/21 Range/Units





  11:00 11:00 11:00 


 


WBC    12.4 H  (4.0-11.0)  K/uL


 


RBC    2.93 L  (4.50-6.50)  M/uL


 


Hgb    9.4 L  (13.0-18.0)  g/dL


 


Hct    28.3 L  (40.0-54.0)  %


 


MCV    97 H  (76-96)  fL


 


MCH    32.1 H  (27.0-32.0)  pg


 


MCHC    33.2  (31.0-35.0)  g/dL


 


RDW    14.4  (11.0-16.0)  %


 


Plt Count    204  (150-400)  K/uL


 


MPV    10.9 H  (6.0-10.0)  fL


 


Neut % (Auto)    86.0 H  (45.0-70.0)  %


 


Lymph % (Auto)    4.6 L  (20.0-40.0)  %


 


Mono % (Auto)    9.1  (3.0-10.0)  %


 


Eos % (Auto)    0.2 L  (1.0-5.0)  %


 


Baso % (Auto)    0.1  (0.0-0.5)  %


 


Neut # (Auto)    10.64 H  (2.00-7.50)  K/uL


 


Lymph # (Auto)    0.57 L  (1.50-4.00)  K/uL


 


Mono # (Auto)    1.12 H  (0.20-0.80)  K/uL


 


Eos # (Auto)    0.03 L  (0.04-0.40)  K/uL


 


Baso # (Auto)    0.01 L  (0.02-0.10)  K/uL


 


Sodium   140   (136-145)  mmol/L


 


Potassium   2.5 L*   (3.5-5.1)  mmol/L


 


Chloride   104   ()  mmol/L


 


Carbon Dioxide   26.4   (21.0-32.0)  mmol/L


 


Anion Gap   12.1   (5.0-15.0)  mmol/L


 


BUN   12   (8-26)  mg/dL


 


Creatinine   1.17   (0.70-1.30)  mg/dL


 


Est Cr Clr Drug Dosing   40.06   mL/min


 


Estimated GFR (MDRD)   60   (>60)  MLS/MIN


 


BUN/Creatinine Ratio   10.3   (6-25)  


 


Glucose   112 H   ()  mg/dL


 


Lactic Acid  1.7    (0.4-2.0)  mmol/L


 


Calcium   7.1 L   (8.5-10.1)  mg/dL


 


Total Bilirubin   0.8   (0.0-1.0)  mg/dL


 


AST   18   (15-37)  U/L


 


ALT   18   (12-78)  U/L


 


Alkaline Phosphatase   44 L   ()  U/L


 


Total Protein   5.6 L   (6.4-8.2)  g/dL


 


Albumin   2.0 L   (3.4-5.0)  g/dL


 


Globulin   3.6   (2.2-4.2)  g/dL


 


Albumin/Globulin Ratio   0.6 L   (0.8-2.0)  











Gabe Results Last 24 Hours: 


                                  Microbiology











 09/21/21 02:20 Aerobic Blood Culture - Preliminary





 Blood    NO GROWTH AFTER 2 DAYS





 Anaerobic Blood Culture - Preliminary





    NO GROWTH AFTER 2 DAYS











Med Orders - Current: 


                               Current Medications





Acetaminophen (Acetaminophen 650 Mg Tab.Er)  650 mg PO Q4H PRN


   PRN Reason: Pain (severe 7-10)


   Last Admin: 09/22/21 06:15 Dose:  650 mg


   Documented by: 


Apixaban (Apixaban 5 Mg Tab)  5 mg PO BID [facility]


   Last Admin: 09/23/21 07:52 Dose:  5 mg


   Documented by: 


Aspirin (Aspirin 81 Mg Tab.Chew)  81 mg PO DAILY [facility]


   Last Admin: 09/23/21 07:52 Dose:  81 mg


   Documented by: 


Bumetanide (Bumetanide 1 Mg Tab)  1 mg PO ASDIRECTED [facility]


Famotidine (Famotidine 20 Mg Tab)  20 mg PO BID [facility]


   Last Admin: 09/23/21 07:51 Dose:  20 mg


   Documented by: 


Furosemide (Furosemide 20 Mg/2 Ml Vial)  20 mg IVPUSH NOW PRN


   PRN Reason: Dyspnea


   Last Admin: 09/22/21 21:55 Dose:  20 mg


   Documented by: 


Furosemide (Furosemide 40 Mg/4 Ml Vial)  40 mg IVPUSH DAILY [facility]


   Last Admin: 09/23/21 07:51 Dose:  40 mg


   Documented by: 


Hydroxyzine HCl (Hydroxyzine Hcl 25 Mg Tab)  25 mg PO QID PRN


   PRN Reason: Itching


Promethazine HCl 6.25 mg/ (Sodium Chloride)  50.25 mls @ 200 mls/hr IV Q6H PRN


   PRN Reason: Nausea/Vomiting


Ceftriaxone Sodium 1 gm/ (Sodium Chloride)  50 mls @ 100 mls/hr IV Q24H [facility]


   Last Admin: 09/23/21 14:22 Dose:  100 mls/hr


   Documented by: 


Azithromycin 500 mg/ Sodium (Chloride)  250 mls @ 250 mls/hr IV Q24H [facility]


   Last Admin: 09/23/21 15:09 Dose:  250 mls/hr


   Documented by: 


Albumin Human (Flexbumin 25%)  200 mls @ 50 mls/hr IV DAILY [facility]


   Last Admin: 09/23/21 07:58 Dose:  50 mls/hr


   Documented by: 


Potassium Chloride/Sodium Chloride (Normal Saline With 20 Meq Kcl)  1,000 mls @ 

100 mls/hr IV ASDIRECTED [facility]


   Last Admin: 09/23/21 16:09 Dose:  75 mls/hr


   Documented by: 


Norepinephrine Bitartrate 4 mg (/ Dextrose/Water)  250 mls @ 7.5 mls/hr IV 

TITRATE [facility]; Protocol


   Last Admin: 09/23/21 14:24 Dose:  3 mcg/min, 11.25 mls/hr


   Documented by: 


Potassium Chloride 40 meq/ (Dextrose/Lactated Ringer's)  520 mls @ 150 mls/hr IV

ASDIRECTED [facility]


Levothyroxine Sodium (Levothyroxine 100 Mcg Tab)  100 mcg PO ACBREAKFAST [facility]


   Last Admin: 09/23/21 07:51 Dose:  100 mcg


   Documented by: 


Metoprolol Tartrate (Metoprolol Tartrate 25 Mg Tab)  25 mg PO BID [facility]


   Last Admin: 09/23/21 07:52 Dose:  25 mg


   Documented by: 


Mometasone Furoate/Formoterol Fumar (Formoterol/Mometasone 200-5 Mcg 8.8 Gm 

Inhaler)  1 puff IH BID [facility]


   Last Admin: 09/23/21 08:21 Dose:  1 puff


   Documented by: 


Morphine Sulfate (Morphine 2 Mg/Ml Syringe)  2 mg IVPUSH Q1H PRN


   PRN Reason: Pain


   Last Admin: 09/23/21 17:36 Dose:  2 mg


   Documented by: 


Nitroglycerin (Nitroglycerin 0.4 Mg Tab.Sl)  0.4 mg SL Q5M PRN


   PRN Reason: Chest Pain


Non-Formulary Medication (Amiodarone Hcl [Amiodarone Hcl])  400 mg PO BID [facility]


   Last Admin: 09/23/21 08:00 Dose:  400 mg


   Documented by: 


Non-Formulary Medication (Timolol Maleate [Timoptic 0.25% Ophth Soln])  1 drop 

EYELF BEDTIME [facility]


   Last Admin: 09/22/21 22:14 Dose:  1 drop


   Documented by: 


Ondansetron HCl (Ondansetron 4 Mg Tab.Dis)  4 mg PO Q4HR PRN


   PRN Reason: Nausea


   Last Admin: 09/23/21 17:36 Dose:  4 mg


   Documented by: 


Tamsulosin HCl (Tamsulosin 0.4 Mg Cap.Er)  0.4 mg PO BEDTIME [facility]


   Last Admin: 09/22/21 21:30 Dose:  0.4 mg


   Documented by: 





Discontinued Medications





Albuterol/Ipratropium (Albuterol/Ipratropium 3.0-0.5 Mg/3 Ml Neb Soln)  3 ml NEB

ONETIME ONE


   Stop: 09/21/21 01:34


   Last Admin: 09/21/21 01:34 Dose:  3 ml


   Documented by: 


Amiodarone HCl (Amiodarone 200 Mg Tab) Confirm Administered Dose 400 mg .ROUTE 

.STK-MED ONE


   Stop: 09/21/21 08:38


   Last Admin: 09/21/21 08:44 Dose:  Not Given


   Documented by: 


Amiodarone HCl (Amiodarone 200 Mg Tab) Confirm Administered Dose 400 mg .ROUTE 

.STK-MED ONE


   Stop: 09/22/21 07:16


   Last Admin: 09/22/21 07:42 Dose:  Not Given


   Documented by: 


Amiodarone HCl (Amiodarone 200 Mg Tab) Confirm Administered Dose 400 mg .ROUTE 

.STK-MED ONE


   Stop: 09/22/21 22:21


   Last Admin: 09/23/21 01:42 Dose:  Not Given


   Documented by: 


Amiodarone HCl (Amiodarone 200 Mg Tab) Confirm Administered Dose 400 mg .ROUTE 

.STK-MED ONE


   Stop: 09/23/21 08:02


   Last Admin: 09/23/21 08:04 Dose:  Not Given


   Documented by: 


Ceftriaxone Sodium (Ceftriaxone 1 Gm Vial) Confirm Administered Dose 1 gm .ROUTE

.STK-MED ONE


   Stop: 09/21/21 02:52


   Last Admin: 09/21/21 06:07 Dose:  Not Given


   Documented by: 


Ceftriaxone Sodium (Ceftriaxone 1 Gm Vial) Confirm Administered Dose 1 gm .ROUTE

.STK-MED ONE


   Stop: 09/23/21 09:51


   Last Admin: 09/23/21 09:50 Dose:  Not Given


   Documented by: 


Furosemide (Furosemide 40 Mg/4 Ml Vial)  40 mg IVPUSH NOW ONE


   Stop: 09/21/21 05:02


   Last Admin: 09/21/21 09:30 Dose:  40 mg


   Documented by: 


Furosemide (Furosemide 40 Mg/4 Ml Vial) Confirm Administered Dose 40 mg .ROUTE 

.STK-MED ONE


   Stop: 09/21/21 07:51


   Last Admin: 09/21/21 08:25 Dose:  Not Given


   Documented by: 


Furosemide (Furosemide 20 Mg/2 Ml Vial)  20 mg IVPUSH NOW ONE


   Stop: 09/22/21 21:44


   Last Admin: 09/22/21 22:04 Dose:  Not Given


   Documented by: 


Furosemide (Furosemide 20 Mg/2 Ml Vial) Confirm Administered Dose 20 mg .ROUTE 

.STK-MED ONE


   Stop: 09/22/21 21:49


   Last Admin: 09/22/21 22:24 Dose:  Not Given


   Documented by: 


Hydroxyzine HCl (Hydroxyzine Hcl 25 Mg Tab)  25 mg PO QID [facility]


   Last Admin: 09/21/21 17:40 Dose:  Not Given


   Documented by: 


Sodium Chloride (Normal Saline)  1,000 mls @ 500 mls/hr IV .BOLUS ONE


   Stop: 09/21/21 03:51


   Last Admin: 09/21/21 05:30 Dose:  500 mls/hr


   Documented by: 


Ceftriaxone Sodium 1 gm/ (Sodium Chloride)  50 mls @ 100 mls/hr IV ONETIME ONE


   Stop: 09/21/21 02:57


   Last Admin: 09/21/21 02:46 Dose:  100 mls/hr


   Documented by: 


Azithromycin 500 mg/ Sodium (Chloride)  250 mls @ 250 mls/hr IV ONETIME ONE


   Stop: 09/21/21 03:27


   Last Admin: 09/21/21 03:25 Dose:  250 mls/hr


   Documented by: 


Norepinephrine Bitartrate 4 mg (/ Dextrose/Water)  250 mls @ 7.5 mls/hr IV 

TITRATE [facility]; Protocol


   Last Titration: 09/22/21 03:00 Dose:  1.5 mcg/min, 5.625 mls/hr


   Documented by: 


Albumin Human (Flexbumin 25%)  200 mls @ 100 mls/hr IV Q12HR ONE


   Stop: 09/21/21 11:01


   Last Admin: 09/21/21 14:16 Dose:  Not Given


   Documented by: 


Sodium Chloride (Normal Saline)  1,000 mls @ 100 mls/hr IV ASDIRECTED [facility]


   Last Admin: 09/21/21 18:00 Dose:  100 mls/hr


   Documented by: 


Sodium Chloride (Normal Saline)  1,000 mls @ 250 mls/hr IV ASDIRECTED [facility]


   Last Admin: 09/21/21 09:15 Dose:  250 mls/hr


   Documented by: 


Morphine Sulfate (Morphine 2 Mg/Ml Syringe) Confirm Administered Dose 2 mg 

.ROUTE .STK-MED ONE


   Stop: 09/21/21 04:35


   Last Admin: 09/21/21 06:08 Dose:  Not Given


   Documented by: 


Morphine Sulfate (Morphine 2 Mg/Ml Syringe) Confirm Administered Dose 2 mg 

.ROUTE .STK-MED ONE


   Stop: 09/23/21 03:56


   Last Admin: 09/23/21 04:57 Dose:  Not Given


   Documented by: 


Non-Formulary Medication (Budesonide/Formoterol [Symbicort 160-4.5 Mcg])  1 puff

INH BID [facility]


   Last Admin: 09/23/21 07:54 Dose:  Not Given


   Documented by: 











- Exam


Quality Assessment: Supplemental Oxygen


Urinary Catheter Total Time: 2Days  6Hours


General: Alert, Oriented, Cooperative


HEENT: Pupils Equal, Pupils Reactive, EOMI


Neck: Supple


Lungs: Decreased Breath Sounds, Crackles, Rales


Cardiovascular: Tachycardia, Other (PVC, V-tach)


GI/Abdominal Exam: Soft, Non-Tender, Abnormal Bowel Sounds (decreased)


Extremities: Normal Inspection


Neurological: No New Focal Deficit


Psy/Mental Status: Alert, Normal Affect, Normal Mood





- Patient Data


Lab Results Last 24 hrs: 


                         Laboratory Results - last 24 hr











  09/23/21 09/23/21 09/23/21 Range/Units





  11:00 11:00 11:00 


 


WBC    12.4 H  (4.0-11.0)  K/uL


 


RBC    2.93 L  (4.50-6.50)  M/uL


 


Hgb    9.4 L  (13.0-18.0)  g/dL


 


Hct    28.3 L  (40.0-54.0)  %


 


MCV    97 H  (76-96)  fL


 


MCH    32.1 H  (27.0-32.0)  pg


 


MCHC    33.2  (31.0-35.0)  g/dL


 


RDW    14.4  (11.0-16.0)  %


 


Plt Count    204  (150-400)  K/uL


 


MPV    10.9 H  (6.0-10.0)  fL


 


Neut % (Auto)    86.0 H  (45.0-70.0)  %


 


Lymph % (Auto)    4.6 L  (20.0-40.0)  %


 


Mono % (Auto)    9.1  (3.0-10.0)  %


 


Eos % (Auto)    0.2 L  (1.0-5.0)  %


 


Baso % (Auto)    0.1  (0.0-0.5)  %


 


Neut # (Auto)    10.64 H  (2.00-7.50)  K/uL


 


Lymph # (Auto)    0.57 L  (1.50-4.00)  K/uL


 


Mono # (Auto)    1.12 H  (0.20-0.80)  K/uL


 


Eos # (Auto)    0.03 L  (0.04-0.40)  K/uL


 


Baso # (Auto)    0.01 L  (0.02-0.10)  K/uL


 


Sodium   140   (136-145)  mmol/L


 


Potassium   2.5 L*   (3.5-5.1)  mmol/L


 


Chloride   104   ()  mmol/L


 


Carbon Dioxide   26.4   (21.0-32.0)  mmol/L


 


Anion Gap   12.1   (5.0-15.0)  mmol/L


 


BUN   12   (8-26)  mg/dL


 


Creatinine   1.17   (0.70-1.30)  mg/dL


 


Est Cr Clr Drug Dosing   40.06   mL/min


 


Estimated GFR (MDRD)   60   (>60)  MLS/MIN


 


BUN/Creatinine Ratio   10.3   (6-25)  


 


Glucose   112 H   ()  mg/dL


 


Lactic Acid  1.7    (0.4-2.0)  mmol/L


 


Calcium   7.1 L   (8.5-10.1)  mg/dL


 


Total Bilirubin   0.8   (0.0-1.0)  mg/dL


 


AST   18   (15-37)  U/L


 


ALT   18   (12-78)  U/L


 


Alkaline Phosphatase   44 L   ()  U/L


 


Total Protein   5.6 L   (6.4-8.2)  g/dL


 


Albumin   2.0 L   (3.4-5.0)  g/dL


 


Globulin   3.6   (2.2-4.2)  g/dL


 


Albumin/Globulin Ratio   0.6 L   (0.8-2.0)  











Result Diagrams: 


                                 09/23/21 11:00





                                 09/23/21 11:00


Gabe Results Last 24 hrs: 


                                  Microbiology











 09/21/21 02:20 Aerobic Blood Culture - Preliminary





 Blood    NO GROWTH AFTER 2 DAYS





 Anaerobic Blood Culture - Preliminary





    NO GROWTH AFTER 2 DAYS














Sepsis Event Note





- Evaluation


Sepsis Screening Result: No Definite Risk





- Focused Exam


Vital Signs: 


                                   Vital Signs











  Temp Pulse Pulse Resp BP BP Pulse Ox


 


 09/23/21 15:36    93    78/58 L  100


 


 09/23/21 12:04    93    86/62 L 


 


 09/23/21 10:50       81/52 L 


 


 09/23/21 10:30    86    76/53 L 


 


 09/23/21 09:35    84  16   89/60 L  86 L


 


 09/23/21 09:29    88  16   103/73  98


 


 09/23/21 08:02    89  20   103/73  100


 


 09/23/21 07:52   90    101/74  


 


 09/23/21 07:00    92  20   96/65  100


 


 09/23/21 06:40  37.2 C   85  18   94/62  100














- Problem List Review


Problem List Initiated/Reviewed/Updated: Yes





- My Orders


Last 24 Hours: 


My Active Orders





09/23/21 12:21


Urinary Catheter Assessment [RC] 08,20 09/23/21 12:30


Insert Urinary Catheter [OM.PC] Q24H 





09/23/21 18:00


Potassium Chloride 40 meq   Dextrose 5%-Lactated Ringers 500 ml IV ASDIRECTED 





09/23/21 20:00


Potassium Chloride [Potassium Chloride Solution]   20 meq PO TID 





09/24/21 05:11


LACTIC ACID SEPSIS W/ REFLEX [LACTATE SEPSIS W/ REFLEX] [CHEM] DAILY 





09/24/21 08:00


Magnesium Oxide   800 mg PO DAILY 














- Plan


Plan:: 





Patient in ICU Day 1 with [place] II score 23 with estimated mortality rate of 

40%.





Cardiogenic shock/Hypotension/CHF - Maintain pressors and titrate. Cardiac 

monitoring. BNP 51377 (prior BNP 2 months ago 5462). 


Pneumonia - Rocephin and Azithromycin daily dosing. F/u CBC today and in the AM 

for further management. 


Sepsis - [place] at [address] - f/u CBC. Fluid resuscitation - monitor 

pulmonary congestion at the same time. Continue IV antibiotics. 


Acute renal failure - Gentle hydration and repeat BUN/Cr. F/u labs daily and as 

needed. Renal diet. 


Anemia - Consider supplementation. F/u CBC and monitor any further drop for 

blood loss. 


COPD - On 3L nasal canula; continue current inhaler and nebulizer treatment. 


Metastatic Pancreatic Cancer - Palliative care patient. May choose to follow up 

with Oncology as directed. 


Hypothyroidism - Stable continue on current medication. 


DVT prophylaxis - SCD's. 





9/22/23 ICU Day #2





Cardiogenic shock/Hypotension/CHF - Maintain pressors and titrate. Cardiac 

monitoring. BNP 69880 (prior BNP 2 months ago 5462). Patient was weaned off last

 night but had a V-tach and defibrillation event and required to return to 

pressors. Consideration for central line discussed with family by Alexis and plan

 for family meeting for [place] as decision by Juan to stop his defibrillator 

and pacer. Place on comfort/palliative care. 


Pneumonia - Rocephin and Azithromycin daily dosing. F/u CBC today and in the AM 

for further management. Likely discontinue when placed into comfort care. 


Sepsis - [place] elevated [place]BC at [address] - f/u CBC. Fluid resuscitation - monitor 

pulmonary congestion at the same time. Monitor symptoms and may d/c further 

labs. 


Acute renal failure - F/u as directed at this time. 


Anemia - F/u as directed.  


COPD - On 3L nasal canula; continue current inhaler and nebulizer treatment for 

comfort. 


Metastatic Pancreatic Cancer - Palliative care patient. May choose to follow up 

with Oncology as directed. 


Hypothyroidism - Stable. 


DVT prophylaxis - SCD's. 





9/23/23 ICU Day #3





Cardiogenic shock/Hypotension/CHF - Maintain pressors and titrate. Cardiac 

monitoring. Patient on 2mcg/hr pressors. Last night magnet was placed and taped 

in place to shut off defibrillator. Patient slept well overnight. Left forearm 

IV line infiltrated with NS and KCl. Discussed plan and patient would like to 

continue current dose of levophed. Discussed comfort cares status and management

 as well as continued care planning. Patient to continue on current medications 

with continued shut of of defibrillator. 


Pneumonia - Rocephin and Azithromycin daily dosing was held yesterday due to 

consideration of comfort cares. IV placed x2 and removed infiltrated IV left 

forearm and right IO line removed. F/u CBC today shows improved WBC and likely 

resolving pneumonia.  


Sepsis - Improved WBC. Fluid resuscitation - monitor pulmonary congestion at the

 same time.  


Acute renal failure - F/u labs show significant improvement of renal function. 


Anemia - F/u as directed.  


COPD - On 3L nasal canula; continue current inhaler and nebulizer treatment for 

comfort. 


Metastatic Pancreatic Cancer - Palliative care patient. May choose to follow up 

with Oncology as directed. 


Hypothyroidism - Stable. 


DVT prophylaxis - SCD's.


Patient to continue current care and be placed in Med-Surg unit. Discharge from 

ICU as pneumonia resolving, sepsis resolving, renal function resolving but 

continued monitoring of Cardiogenic shock, Pulmonary vascular congestion, 

hypokalemia and levophed management.

## 2021-09-24 NOTE — PCM.PN
- General Info


Date of Service: 09/24/21


Subjective Update: 





Patient notes he is tired. He is alert and able to express his wishes. He had a 

run of torsades this am requiring IV Magnesium management which did resolve the 

Torsades and chest pain. Family present and patient requests no more IV 

insertions or labs at this time. He would like to keep the levophed at this 

time. 


Functional Status: Reports: Pain Controlled





- Review of Systems


General: Reports: Weakness


HEENT: Reports: No Symptoms


Pulmonary: Reports: Shortness of Breath


Cardiovascular: Reports: Dyspnea on Exertion


Gastrointestinal: Reports: Decreased Appetite


Genitourinary: Reports: No Symptoms


Musculoskeletal: Reports: No Symptoms


Skin: Reports: No Symptoms


Neurological: Reports: Weakness


Psychiatric: Reports: No Symptoms





- Patient Data


Vitals - Most Recent: 


                                Last Vital Signs











Temp  36.6 C   09/23/21 20:00


 


Pulse  110 H  09/24/21 07:33


 


Resp  16   09/24/21 07:33


 


BP  118/77   09/24/21 07:33


 


Pulse Ox  93 L  09/24/21 07:33











Weight - Most Recent: 63.503 kg


I&O - Last 24 Hours: 


                                 Intake & Output











 09/23/21 09/24/21 09/24/21





 22:59 06:59 14:59


 


Intake Total 2488 746 


 


Output Total 7093 1500 


 


Balance -8488 -305 











Lab Results Last 24 Hours: 


                         Laboratory Results - last 24 hr











  09/23/21 09/23/21 09/23/21 Range/Units





  11:00 11:00 11:00 


 


WBC    12.4 H  (4.0-11.0)  K/uL


 


RBC    2.93 L  (4.50-6.50)  M/uL


 


Hgb    9.4 L  (13.0-18.0)  g/dL


 


Hct    28.3 L  (40.0-54.0)  %


 


MCV    97 H  (76-96)  fL


 


MCH    32.1 H  (27.0-32.0)  pg


 


MCHC    33.2  (31.0-35.0)  g/dL


 


RDW    14.4  (11.0-16.0)  %


 


Plt Count    204  (150-400)  K/uL


 


MPV    10.9 H  (6.0-10.0)  fL


 


Neut % (Auto)    86.0 H  (45.0-70.0)  %


 


Lymph % (Auto)    4.6 L  (20.0-40.0)  %


 


Mono % (Auto)    9.1  (3.0-10.0)  %


 


Eos % (Auto)    0.2 L  (1.0-5.0)  %


 


Baso % (Auto)    0.1  (0.0-0.5)  %


 


Neut # (Auto)    10.64 H  (2.00-7.50)  K/uL


 


Lymph # (Auto)    0.57 L  (1.50-4.00)  K/uL


 


Mono # (Auto)    1.12 H  (0.20-0.80)  K/uL


 


Eos # (Auto)    0.03 L  (0.04-0.40)  K/uL


 


Baso # (Auto)    0.01 L  (0.02-0.10)  K/uL


 


Sodium   140   (136-145)  mmol/L


 


Potassium   2.5 L*   (3.5-5.1)  mmol/L


 


Chloride   104   ()  mmol/L


 


Carbon Dioxide   26.4   (21.0-32.0)  mmol/L


 


Anion Gap   12.1   (5.0-15.0)  mmol/L


 


BUN   12   (8-26)  mg/dL


 


Creatinine   1.17   (0.70-1.30)  mg/dL


 


Est Cr Clr Drug Dosing   40.06   mL/min


 


Estimated GFR (MDRD)   60   (>60)  MLS/MIN


 


BUN/Creatinine Ratio   10.3   (6-25)  


 


Glucose   112 H   ()  mg/dL


 


Lactic Acid  1.7    (0.4-2.0)  mmol/L


 


Calcium   7.1 L   (8.5-10.1)  mg/dL


 


Total Bilirubin   0.8   (0.0-1.0)  mg/dL


 


AST   18   (15-37)  U/L


 


ALT   18   (12-78)  U/L


 


Alkaline Phosphatase   44 L   ()  U/L


 


Total Protein   5.6 L   (6.4-8.2)  g/dL


 


Albumin   2.0 L   (3.4-5.0)  g/dL


 


Globulin   3.6   (2.2-4.2)  g/dL


 


Albumin/Globulin Ratio   0.6 L   (0.8-2.0)  














  09/24/21 Range/Units





  07:40 


 


WBC   (4.0-11.0)  K/uL


 


RBC   (4.50-6.50)  M/uL


 


Hgb   (13.0-18.0)  g/dL


 


Hct   (40.0-54.0)  %


 


MCV   (76-96)  fL


 


MCH   (27.0-32.0)  pg


 


MCHC   (31.0-35.0)  g/dL


 


RDW   (11.0-16.0)  %


 


Plt Count   (150-400)  K/uL


 


MPV   (6.0-10.0)  fL


 


Neut % (Auto)   (45.0-70.0)  %


 


Lymph % (Auto)   (20.0-40.0)  %


 


Mono % (Auto)   (3.0-10.0)  %


 


Eos % (Auto)   (1.0-5.0)  %


 


Baso % (Auto)   (0.0-0.5)  %


 


Neut # (Auto)   (2.00-7.50)  K/uL


 


Lymph # (Auto)   (1.50-4.00)  K/uL


 


Mono # (Auto)   (0.20-0.80)  K/uL


 


Eos # (Auto)   (0.04-0.40)  K/uL


 


Baso # (Auto)   (0.02-0.10)  K/uL


 


Sodium   (136-145)  mmol/L


 


Potassium   (3.5-5.1)  mmol/L


 


Chloride   ()  mmol/L


 


Carbon Dioxide   (21.0-32.0)  mmol/L


 


Anion Gap   (5.0-15.0)  mmol/L


 


BUN   (8-26)  mg/dL


 


Creatinine   (0.70-1.30)  mg/dL


 


Est Cr Clr Drug Dosing   mL/min


 


Estimated GFR (MDRD)   (>60)  MLS/MIN


 


BUN/Creatinine Ratio   (6-25)  


 


Glucose   ()  mg/dL


 


Lactic Acid  1.1  (0.4-2.0)  mmol/L


 


Calcium   (8.5-10.1)  mg/dL


 


Total Bilirubin   (0.0-1.0)  mg/dL


 


AST   (15-37)  U/L


 


ALT   (12-78)  U/L


 


Alkaline Phosphatase   ()  U/L


 


Total Protein   (6.4-8.2)  g/dL


 


Albumin   (3.4-5.0)  g/dL


 


Globulin   (2.2-4.2)  g/dL


 


Albumin/Globulin Ratio   (0.8-2.0)  











Gabe Results Last 24 Hours: 


                                  Microbiology











 09/21/21 02:20 Aerobic Blood Culture - Preliminary





 Blood    NO GROWTH AFTER 3 DAYS





 Anaerobic Blood Culture - Preliminary





    NO GROWTH AFTER 3 DAYS











Med Orders - Current: 


                               Current Medications





Acetaminophen (Acetaminophen 650 Mg Tab.Er)  650 mg PO Q4H PRN


   PRN Reason: Pain (severe 7-10)


   Last Admin: 09/22/21 06:15 Dose:  650 mg


   Documented by: 


Apixaban (Apixaban 5 Mg Tab)  5 mg PO BID [facility]


   Last Admin: 09/23/21 20:42 Dose:  5 mg


   Documented by: 


Aspirin (Aspirin 81 Mg Tab.Chew)  81 mg PO DAILY [facility]


   Last Admin: 09/23/21 07:52 Dose:  81 mg


   Documented by: 


Bumetanide (Bumetanide 1 Mg Tab)  1 mg PO ASDIRECTED [facility]


Famotidine (Famotidine 20 Mg Tab)  20 mg PO BID [facility]


   Last Admin: 09/23/21 20:43 Dose:  20 mg


   Documented by: 


Furosemide (Furosemide 20 Mg/2 Ml Vial)  20 mg IVPUSH NOW PRN


   PRN Reason: Dyspnea


   Last Admin: 09/22/21 21:55 Dose:  20 mg


   Documented by: 


Furosemide (Furosemide 40 Mg/4 Ml Vial)  40 mg IVPUSH DAILY [facility]


   Last Admin: 09/23/21 07:51 Dose:  40 mg


   Documented by: 


Hydroxyzine HCl (Hydroxyzine Hcl 25 Mg Tab)  25 mg PO QID PRN


   PRN Reason: Itching


Promethazine HCl 6.25 mg/ (Sodium Chloride)  50.25 mls @ 200 mls/hr IV Q6H PRN


   PRN Reason: Nausea/Vomiting


Ceftriaxone Sodium 1 gm/ (Sodium Chloride)  50 mls @ 100 mls/hr IV Q24H [facility]


   Last Admin: 09/23/21 14:22 Dose:  100 mls/hr


   Documented by: 


Azithromycin 500 mg/ Sodium (Chloride)  250 mls @ 250 mls/hr IV Q24H [facility]


   Last Admin: 09/23/21 15:09 Dose:  250 mls/hr


   Documented by: 


Albumin Human (Flexbumin 25%)  200 mls @ 50 mls/hr IV DAILY [facility]


   Last Admin: 09/23/21 07:58 Dose:  50 mls/hr


   Documented by: 


Potassium Chloride/Sodium Chloride (Normal Saline With 20 Meq Kcl)  1,000 mls @ 

100 mls/hr IV ASDIRECTED [facility]


   Last Admin: 09/23/21 16:09 Dose:  75 mls/hr


   Documented by: 


Norepinephrine Bitartrate 4 mg (/ Dextrose/Water)  250 mls @ 7.5 mls/hr IV 

TITRATE [facility]; Protocol


   Last Admin: 09/23/21 14:24 Dose:  3 mcg/min, 11.25 mls/hr


   Documented by: 


Levothyroxine Sodium (Levothyroxine 100 Mcg Tab)  100 mcg PO ACBREAKFAST [facility]


   Last Admin: 09/23/21 07:51 Dose:  100 mcg


   Documented by: 


Magnesium Oxide (Magnesium Oxide 400 Mg Tab)  800 mg PO DAILY [facility]


Magnesium Sulfate/Dextrose (Magnesium Sulfate/D5w 1 Gm/100 Ml Premix Bag)  1 gm 

IV ASDIRECTED [facility]


Metoprolol Tartrate (Metoprolol Tartrate 25 Mg Tab)  25 mg PO BID [facility]


   Last Admin: 09/23/21 20:37 Dose:  Not Given


   Documented by: 


Mometasone Furoate/Formoterol Fumar (Formoterol/Mometasone 200-5 Mcg 8.8 Gm 

Inhaler)  1 puff IH BID [facility]


   Last Admin: 09/23/21 20:43 Dose:  1 puff


   Documented by: 


Morphine Sulfate (Morphine 2 Mg/Ml Syringe)  2 mg IVPUSH Q1H PRN


   PRN Reason: Pain


   Last Admin: 09/23/21 17:36 Dose:  2 mg


   Documented by: 


Nitroglycerin (Nitroglycerin 0.4 Mg Tab.Sl)  0.4 mg SL Q5M PRN


   PRN Reason: Chest Pain


Non-Formulary Medication (Amiodarone Hcl [Amiodarone Hcl])  400 mg PO BID [facility]


   Last Admin: 09/23/21 20:43 Dose:  400 mg


   Documented by: 


Non-Formulary Medication (Timolol Maleate [Timoptic 0.25% Ophth Soln])  1 drop 

EYELF BEDTIME [facility]


   Last Admin: 09/23/21 20:43 Dose:  1 drop


   Documented by: 


Ondansetron HCl (Ondansetron 4 Mg Tab.Dis)  4 mg PO Q4HR PRN


   PRN Reason: Nausea


   Last Admin: 09/23/21 17:36 Dose:  4 mg


   Documented by: 


Potassium Chloride (Potassium Chloride 10% 20 Meq/15 Ml Soln 15 Ml Ud Cup)  20 

meq PO TID [facility]


   Last Admin: 09/23/21 20:43 Dose:  20 meq


   Documented by: 


Tamsulosin HCl (Tamsulosin 0.4 Mg Cap.Er)  0.4 mg PO BEDTIME [facility]


   Last Admin: 09/23/21 20:42 Dose:  0.4 mg


   Documented by: 





Discontinued Medications





Albuterol/Ipratropium (Albuterol/Ipratropium 3.0-0.5 Mg/3 Ml Neb Soln)  3 ml NEB

ONETIME ONE


   Stop: 09/21/21 01:34


   Last Admin: 09/21/21 01:34 Dose:  3 ml


   Documented by: 


Amiodarone HCl (Amiodarone 200 Mg Tab) Confirm Administered Dose 400 mg .ROUTE 

.STK-MED ONE


   Stop: 09/21/21 08:38


   Last Admin: 09/21/21 08:44 Dose:  Not Given


   Documented by: 


Amiodarone HCl (Amiodarone 200 Mg Tab) Confirm Administered Dose 400 mg .ROUTE 

.STK-MED ONE


   Stop: 09/22/21 07:16


   Last Admin: 09/22/21 07:42 Dose:  Not Given


   Documented by: 


Amiodarone HCl (Amiodarone 200 Mg Tab) Confirm Administered Dose 400 mg .ROUTE 

.STK-MED ONE


   Stop: 09/22/21 22:21


   Last Admin: 09/23/21 01:42 Dose:  Not Given


   Documented by: 


Amiodarone HCl (Amiodarone 200 Mg Tab) Confirm Administered Dose 400 mg .ROUTE 

.STK-MED ONE


   Stop: 09/23/21 08:02


   Last Admin: 09/23/21 08:04 Dose:  Not Given


   Documented by: 


Amiodarone HCl (Amiodarone 200 Mg Tab) Confirm Administered Dose 400 mg .ROUTE 

.STK-MED ONE


   Stop: 09/23/21 20:43


   Last Admin: 09/23/21 20:46 Dose:  Not Given


   Documented by: 


Ceftriaxone Sodium (Ceftriaxone 1 Gm Vial) Confirm Administered Dose 1 gm .ROUTE

.STK-MED ONE


   Stop: 09/21/21 02:52


   Last Admin: 09/21/21 06:07 Dose:  Not Given


   Documented by: 


Ceftriaxone Sodium (Ceftriaxone 1 Gm Vial) Confirm Administered Dose 1 gm .ROUTE

.STK-MED ONE


   Stop: 09/23/21 09:51


   Last Admin: 09/23/21 09:50 Dose:  Not Given


   Documented by: 


Furosemide (Furosemide 40 Mg/4 Ml Vial)  40 mg IVPUSH NOW ONE


   Stop: 09/21/21 05:02


   Last Admin: 09/21/21 09:30 Dose:  40 mg


   Documented by: 


Furosemide (Furosemide 40 Mg/4 Ml Vial) Confirm Administered Dose 40 mg .ROUTE 

.STK-MED ONE


   Stop: 09/21/21 07:51


   Last Admin: 09/21/21 08:25 Dose:  Not Given


   Documented by: 


Furosemide (Furosemide 20 Mg/2 Ml Vial)  20 mg IVPUSH NOW ONE


   Stop: 09/22/21 21:44


   Last Admin: 09/22/21 22:04 Dose:  Not Given


   Documented by: 


Furosemide (Furosemide 20 Mg/2 Ml Vial) Confirm Administered Dose 20 mg .ROUTE 

.STK-MED ONE


   Stop: 09/22/21 21:49


   Last Admin: 09/22/21 22:24 Dose:  Not Given


   Documented by: 


Hydroxyzine HCl (Hydroxyzine Hcl 25 Mg Tab)  25 mg PO QID [facility]


   Last Admin: 09/21/21 17:40 Dose:  Not Given


   Documented by: 


Sodium Chloride (Normal Saline)  1,000 mls @ 500 mls/hr IV .BOLUS ONE


   Stop: 09/21/21 03:51


   Last Admin: 09/21/21 05:30 Dose:  500 mls/hr


   Documented by: 


Ceftriaxone Sodium 1 gm/ (Sodium Chloride)  50 mls @ 100 mls/hr IV ONETIME ONE


   Stop: 09/21/21 02:57


   Last Admin: 09/21/21 02:46 Dose:  100 mls/hr


   Documented by: 


Azithromycin 500 mg/ Sodium (Chloride)  250 mls @ 250 mls/hr IV ONETIME ONE


   Stop: 09/21/21 03:27


   Last Admin: 09/21/21 03:25 Dose:  250 mls/hr


   Documented by: 


Norepinephrine Bitartrate 4 mg (/ Dextrose/Water)  250 mls @ 7.5 mls/hr IV 

TITRATE [facility]; Protocol


   Last Titration: 09/22/21 03:00 Dose:  1.5 mcg/min, 5.625 mls/hr


   Documented by: 


Albumin Human (Flexbumin 25%)  200 mls @ 100 mls/hr IV Q12HR ONE


   Stop: 09/21/21 11:01


   Last Admin: 09/21/21 14:16 Dose:  Not Given


   Documented by: 


Sodium Chloride (Normal Saline)  1,000 mls @ 100 mls/hr IV ASDIRECTED [facility]


   Last Admin: 09/21/21 18:00 Dose:  100 mls/hr


   Documented by: 


Sodium Chloride (Normal Saline)  1,000 mls @ 250 mls/hr IV ASDIRECTED [facility]


   Last Admin: 09/21/21 09:15 Dose:  250 mls/hr


   Documented by: 


Potassium Chloride 40 meq/ (Dextrose/Lactated Ringer's)  520 mls @ 150 mls/hr IV

ONETIME ONE


   Stop: 09/23/21 21:27


   Last Admin: 09/23/21 20:17 Dose:  Not Given


   Documented by: 


Potassium Chloride/Sodium Chloride (Normal Saline With 40 Meq Kcl)  500 mls @ 

150 mls/hr IV ONETIME ONE


   Stop: 09/23/21 23:34


   Last Admin: 09/23/21 20:38 Dose:  150 mls/hr


   Documented by: 


Potassium Chloride/Sodium Chloride (Normal Saline With 40 Meq Kcl) Confirm 

Administered Dose 1,000 mls @ as directed .ROUTE .STK-MED ONE


   Stop: 09/23/21 20:21


   Last Admin: 09/23/21 20:44 Dose:  150 mls/hr


   Documented by: 


Magnesium Sulfate/Dextrose (Magnesium Sulfate In D5w 1 Gm/100 Ml) Confirm 

Administered Dose 2 gm in 200 mls @ as directed .ROUTE .STK-MED ONE


   Stop: 09/24/21 06:42


   Last Admin: 09/24/21 07:43 Dose:  Not Given


   Documented by: 


Magnesium Sulfate/Dextrose (Magnesium Sulfate/D5w 1 Gm/100 Ml Premix Bag)  1 gm 

IV ONETIME ONE


   Stop: 09/24/21 07:47


Morphine Sulfate (Morphine 2 Mg/Ml Syringe) Confirm Administered Dose 2 mg 

.ROUTE .STK-MED ONE


   Stop: 09/21/21 04:35


   Last Admin: 09/21/21 06:08 Dose:  Not Given


   Documented by: 


Morphine Sulfate (Morphine 2 Mg/Ml Syringe) Confirm Administered Dose 2 mg .R

OUTE .STK-MED ONE


   Stop: 09/23/21 03:56


   Last Admin: 09/23/21 04:57 Dose:  Not Given


   Documented by: 


Non-Formulary Medication (Budesonide/Formoterol [Symbicort 160-4.5 Mcg])  1 puff

INH BID [facility]


   Last Admin: 09/23/21 07:54 Dose:  Not Given


   Documented by: 











- Exam


Quality Assessment: Supplemental Oxygen


Urinary Catheter Total Time: 2Days  15Hours


General: Alert, Oriented, Cooperative


HEENT: Pupils Equal, Pupils Reactive, EOMI


Lungs: Decreased Breath Sounds, Crackles, Rales


Cardiovascular: Tachycardia, Other (PVC's, V-tach episodes, recent torsades)


GI/Abdominal Exam: Other (deferred)


Back Exam: Other (bed rest - deferred)


Neurological: No New Focal Deficit


Psy/Mental Status: Alert, Normal Affect, Normal Mood





- Patient Data


Lab Results Last 24 hrs: 


                         Laboratory Results - last 24 hr











  09/23/21 09/23/21 09/23/21 Range/Units





  11:00 11:00 11:00 


 


WBC    12.4 H  (4.0-11.0)  K/uL


 


RBC    2.93 L  (4.50-6.50)  M/uL


 


Hgb    9.4 L  (13.0-18.0)  g/dL


 


Hct    28.3 L  (40.0-54.0)  %


 


MCV    97 H  (76-96)  fL


 


MCH    32.1 H  (27.0-32.0)  pg


 


MCHC    33.2  (31.0-35.0)  g/dL


 


RDW    14.4  (11.0-16.0)  %


 


Plt Count    204  (150-400)  K/uL


 


MPV    10.9 H  (6.0-10.0)  fL


 


Neut % (Auto)    86.0 H  (45.0-70.0)  %


 


Lymph % (Auto)    4.6 L  (20.0-40.0)  %


 


Mono % (Auto)    9.1  (3.0-10.0)  %


 


Eos % (Auto)    0.2 L  (1.0-5.0)  %


 


Baso % (Auto)    0.1  (0.0-0.5)  %


 


Neut # (Auto)    10.64 H  (2.00-7.50)  K/uL


 


Lymph # (Auto)    0.57 L  (1.50-4.00)  K/uL


 


Mono # (Auto)    1.12 H  (0.20-0.80)  K/uL


 


Eos # (Auto)    0.03 L  (0.04-0.40)  K/uL


 


Baso # (Auto)    0.01 L  (0.02-0.10)  K/uL


 


Sodium   140   (136-145)  mmol/L


 


Potassium   2.5 L*   (3.5-5.1)  mmol/L


 


Chloride   104   ()  mmol/L


 


Carbon Dioxide   26.4   (21.0-32.0)  mmol/L


 


Anion Gap   12.1   (5.0-15.0)  mmol/L


 


BUN   12   (8-26)  mg/dL


 


Creatinine   1.17   (0.70-1.30)  mg/dL


 


Est Cr Clr Drug Dosing   40.06   mL/min


 


Estimated GFR (MDRD)   60   (>60)  MLS/MIN


 


BUN/Creatinine Ratio   10.3   (6-25)  


 


Glucose   112 H   ()  mg/dL


 


Lactic Acid  1.7    (0.4-2.0)  mmol/L


 


Calcium   7.1 L   (8.5-10.1)  mg/dL


 


Total Bilirubin   0.8   (0.0-1.0)  mg/dL


 


AST   18   (15-37)  U/L


 


ALT   18   (12-78)  U/L


 


Alkaline Phosphatase   44 L   ()  U/L


 


Total Protein   5.6 L   (6.4-8.2)  g/dL


 


Albumin   2.0 L   (3.4-5.0)  g/dL


 


Globulin   3.6   (2.2-4.2)  g/dL


 


Albumin/Globulin Ratio   0.6 L   (0.8-2.0)  














  09/24/21 Range/Units





  07:40 


 


WBC   (4.0-11.0)  K/uL


 


RBC   (4.50-6.50)  M/uL


 


Hgb   (13.0-18.0)  g/dL


 


Hct   (40.0-54.0)  %


 


MCV   (76-96)  fL


 


MCH   (27.0-32.0)  pg


 


MCHC   (31.0-35.0)  g/dL


 


RDW   (11.0-16.0)  %


 


Plt Count   (150-400)  K/uL


 


MPV   (6.0-10.0)  fL


 


Neut % (Auto)   (45.0-70.0)  %


 


Lymph % (Auto)   (20.0-40.0)  %


 


Mono % (Auto)   (3.0-10.0)  %


 


Eos % (Auto)   (1.0-5.0)  %


 


Baso % (Auto)   (0.0-0.5)  %


 


Neut # (Auto)   (2.00-7.50)  K/uL


 


Lymph # (Auto)   (1.50-4.00)  K/uL


 


Mono # (Auto)   (0.20-0.80)  K/uL


 


Eos # (Auto)   (0.04-0.40)  K/uL


 


Baso # (Auto)   (0.02-0.10)  K/uL


 


Sodium   (136-145)  mmol/L


 


Potassium   (3.5-5.1)  mmol/L


 


Chloride   ()  mmol/L


 


Carbon Dioxide   (21.0-32.0)  mmol/L


 


Anion Gap   (5.0-15.0)  mmol/L


 


BUN   (8-26)  mg/dL


 


Creatinine   (0.70-1.30)  mg/dL


 


Est Cr Clr Drug Dosing   mL/min


 


Estimated GFR (MDRD)   (>60)  MLS/MIN


 


BUN/Creatinine Ratio   (6-25)  


 


Glucose   ()  mg/dL


 


Lactic Acid  1.1  (0.4-2.0)  mmol/L


 


Calcium   (8.5-10.1)  mg/dL


 


Total Bilirubin   (0.0-1.0)  mg/dL


 


AST   (15-37)  U/L


 


ALT   (12-78)  U/L


 


Alkaline Phosphatase   ()  U/L


 


Total Protein   (6.4-8.2)  g/dL


 


Albumin   (3.4-5.0)  g/dL


 


Globulin   (2.2-4.2)  g/dL


 


Albumin/Globulin Ratio   (0.8-2.0)  











Result Diagrams: 


                                 09/23/21 11:00





                                 09/23/21 11:00


Gabe Results Last 24 hrs: 


                                  Microbiology











 09/21/21 02:20 Aerobic Blood Culture - Preliminary





 Blood    NO GROWTH AFTER 3 DAYS





 Anaerobic Blood Culture - Preliminary





    NO GROWTH AFTER 3 DAYS














Sepsis Event Note





- Evaluation


Sepsis Screening Result: No Definite Risk





- Focused Exam


Vital Signs: 


                                   Vital Signs











  Pulse Resp BP Pulse Ox


 


 09/24/21 07:33  110 H  16  118/77  93 L


 


 09/24/21 04:00  99  16  98/69  100


 


 09/23/21 23:57  96  12  94/62  100














- Problem List Review


Problem List Initiated/Reviewed/Updated: Yes





- My Orders


Last 24 Hours: 


My Active Orders





09/23/21 12:21


Urinary Catheter Assessment [RC] 08,20 09/23/21 12:30


Insert Urinary Catheter [OM.PC] Q24H 





09/23/21 20:00


Potassium Chloride [Potassium Chloride Solution]   20 meq PO TID 





09/24/21 08:00


Magnesium Oxide   800 mg PO DAILY 


Magnesium Sulfate/D5W [Magnesium Sulfate in D5W 1 GM/100 ML]   1 gm IV 

ASDIRECTED 














- Plan


Plan:: 





Patient in ICU Day 1 with [place] II score 23 with estimated mortality rate of 

40%.





Cardiogenic shock/Hypotension/CHF - Maintain pressors and titrate. Cardiac 

monitoring. BNP 19277 (prior BNP 2 months ago 5462). 


Pneumonia - Rocephin and Azithromycin daily dosing. F/u CBC today and in the AM 

for further management. 


Sepsis - [place] at [address] - f/u CBC. Fluid resuscitation - monitor 

pulmonary congestion at the same time. Continue IV antibiotics. 


Acute renal failure - Gentle hydration and repeat BUN/Cr. F/u labs daily and as 

needed. Renal diet. 


Anemia - Consider supplementation. F/u CBC and monitor any further drop for 

blood loss. 


COPD - On 3L nasal canula; continue current inhaler and nebulizer treatment. 


Metastatic Pancreatic Cancer - Palliative care patient. May choose to follow up 

with Oncology as directed. 


Hypothyroidism - Stable continue on current medication. 


DVT prophylaxis - SCD's. 





9/22/23 ICU Day #2





Cardiogenic shock/Hypotension/CHF - Maintain pressors and titrate. Cardiac monit

oring. BNP 20206 (prior BNP 2 months ago 5462). Patient was weaned off last 

night but had a V-tach and defibrillation event and required to return to 

pressors. Consideration for central line discussed with family by Alexis and plan

for family meeting for puma as decision by Juan to stop his defibrillator 

and pacer. Place on comfort/palliative care. 


Pneumonia - Rocephin and Azithromycin daily dosing. F/u CBC today and in the AM 

for further management. Likely discontinue when placed into comfort care. 


Sepsis - [place] at [address] - f/u CBC. Fluid resuscitation - monitor 

pulmonary congestion at the same time. Monitor symptoms and may d/c further 

labs. 


Acute renal failure - F/u as directed at this time. 


Anemia - F/u as directed.  


COPD - On 3L nasal canula; continue current inhaler and nebulizer treatment for 

comfort. 


Metastatic Pancreatic Cancer - Palliative care patient. May choose to follow up 

with Oncology as directed. 


Hypothyroidism - Stable. 


DVT prophylaxis - SCD's. 





9/23/23 ICU Day #3





Cardiogenic shock/Hypotension/CHF - Maintain pressors and titrate. Cardiac 

monitoring. Patient on 2mcg/hr pressors. Last night magnet was placed and taped 

in place to shut off defibrillator. Patient slept well overnight. Left forearm 

IV line infiltrated with NS and KCl. Discussed plan and patient would like to 

continue current dose of levophed. Discussed comfort cares status and management

as well as continued care planning. Patient to continue on current medications 

with continued shut of of defibrillator. 


Pneumonia - Rocephin and Azithromycin daily dosing was held yesterday due to 

consideration of comfort cares. IV placed x2 and removed infiltrated IV left 

forearm and right IO line removed. F/u CBC today shows improved WBC and likely 

resolving pneumonia.  


Sepsis - Improved WBC. Fluid resuscitation - monitor pulmonary congestion at the

same time.  


Acute renal failure - F/u labs show significant improvement of renal function. 


Anemia - F/u as directed.  


COPD - On 3L nasal canula; continue current inhaler and nebulizer treatment for 

comfort. 


Metastatic Pancreatic Cancer - Palliative care patient. May choose to follow up 

with Oncology as directed. 


Hypothyroidism - Stable. 


DVT prophylaxis - SCD's.


Patient to continue current care and be placed in Med-Surg unit. Discharge from 

ICU as pneumonia resolving, sepsis resolving, renal function resolving but 

continued monitoring of Cardiogenic shock, Pulmonary vascular congestion, 

hypokalemia and levophed management. 





9/24/23 Med-Surg Day #1





Cardiogenic shock/Hypotension/CHF - Maintain pressors but no further increase 

and discussion of taper and discontinue. Cardiac monitoring to continue at this 

time. Patient had torsades overnight and was given Magnesium after new IV's 

placed. Discussed plan and patient would like to continue current dose of 

levophed. Discussed comfort cares status and management as well as continued 

care planning. Patient refused oral meds today. 


Pneumonia -No longer a treatment plan and likely resolved with prior Antibiotics

treatment. No further labs to be done per patient wishes at this time. 


Sepsis - Improved WBC. Fluid resuscitation - monitor pulmonary congestion at the

same time.  


Acute renal failure - Labs yesterday show significant improvement of renal funct

ion - Resolved. 


Anemia - F/u as directed.  No plan of care for anemia at this time. 


COPD - On 3L nasal canula; continue current inhaler and nebulizer treatment for 

comfort. Continue Oxygen as needed. 


Metastatic Pancreatic Cancer - Palliative care patient. May choose to follow up 

with Oncology as directed. 


Hypothyroidism - Stable. 


DVT prophylaxis - SCD's.


Patient to continue current care in Med-Surg unit. Cardiogenic shock, Pulmonary 

vascular congestion, Pain management and levophed management to continue. No 

further IV insertions or labs. Family in agreement with plan of care.